# Patient Record
Sex: FEMALE | Race: WHITE | Employment: FULL TIME | URBAN - NONMETROPOLITAN AREA
[De-identification: names, ages, dates, MRNs, and addresses within clinical notes are randomized per-mention and may not be internally consistent; named-entity substitution may affect disease eponyms.]

---

## 2018-04-29 ENCOUNTER — APPOINTMENT (OUTPATIENT)
Dept: CT IMAGING | Age: 43
End: 2018-04-29
Payer: COMMERCIAL

## 2018-04-29 ENCOUNTER — HOSPITAL ENCOUNTER (EMERGENCY)
Age: 43
Discharge: HOME | End: 2018-04-29
Payer: COMMERCIAL

## 2018-04-29 VITALS
TEMPERATURE: 98.4 F | WEIGHT: 170 LBS | OXYGEN SATURATION: 94 % | DIASTOLIC BLOOD PRESSURE: 70 MMHG | SYSTOLIC BLOOD PRESSURE: 111 MMHG | RESPIRATION RATE: 18 BRPM | HEIGHT: 65 IN | BODY MASS INDEX: 28.32 KG/M2 | HEART RATE: 97 BPM

## 2018-04-29 DIAGNOSIS — G43.009 ATYPICAL MIGRAINE: Primary | ICD-10-CM

## 2018-04-29 LAB
ALBUMIN SERPL-MCNC: 4.5 G/DL (ref 3.5–5.2)
ALP BLD-CCNC: 30 U/L (ref 35–104)
ALT SERPL-CCNC: 12 U/L (ref 5–33)
ANION GAP SERPL CALCULATED.3IONS-SCNC: 14 MMOL/L (ref 7–19)
AST SERPL-CCNC: 14 U/L (ref 5–32)
BASOPHILS ABSOLUTE: 0 K/UL (ref 0–0.2)
BASOPHILS RELATIVE PERCENT: 0.3 % (ref 0–1)
BILIRUB SERPL-MCNC: 0.4 MG/DL (ref 0.2–1.2)
BUN BLDV-MCNC: 15 MG/DL (ref 6–20)
CALCIUM SERPL-MCNC: 9.3 MG/DL (ref 8.6–10)
CHLORIDE BLD-SCNC: 97 MMOL/L (ref 98–111)
CO2: 23 MMOL/L (ref 22–29)
CREAT SERPL-MCNC: 0.6 MG/DL (ref 0.5–0.9)
EOSINOPHILS ABSOLUTE: 0.1 K/UL (ref 0–0.6)
EOSINOPHILS RELATIVE PERCENT: 1.3 % (ref 0–5)
GFR NON-AFRICAN AMERICAN: >60
GLUCOSE BLD-MCNC: 113 MG/DL (ref 74–109)
GLUCOSE, CSF: 59 MG/DL (ref 40–70)
HCT VFR BLD CALC: 45.6 % (ref 37–47)
HEMOGLOBIN: 14.9 G/DL (ref 12–16)
LYMPHOCYTES ABSOLUTE: 1.8 K/UL (ref 1.1–4.5)
LYMPHOCYTES RELATIVE PERCENT: 19.9 % (ref 20–40)
MCH RBC QN AUTO: 30.3 PG (ref 27–31)
MCHC RBC AUTO-ENTMCNC: 32.7 G/DL (ref 33–37)
MCV RBC AUTO: 92.7 FL (ref 81–99)
MONOCYTES ABSOLUTE: 0.3 K/UL (ref 0–0.9)
MONOCYTES RELATIVE PERCENT: 3.7 % (ref 0–10)
NEUTROPHILS ABSOLUTE: 6.8 K/UL (ref 1.5–7.5)
NEUTROPHILS RELATIVE PERCENT: 74.6 % (ref 50–65)
PDW BLD-RTO: 12.5 % (ref 11.5–14.5)
PLATELET # BLD: 348 K/UL (ref 130–400)
PMV BLD AUTO: 10.6 FL (ref 9.4–12.3)
POTASSIUM SERPL-SCNC: 4.3 MMOL/L (ref 3.5–5)
PROTEIN CSF: 31 MG/DL (ref 15–45)
RBC # BLD: 4.92 M/UL (ref 4.2–5.4)
SODIUM BLD-SCNC: 134 MMOL/L (ref 136–145)
TOTAL PROTEIN: 7 G/DL (ref 6.6–8.7)
WBC # BLD: 9.1 K/UL (ref 4.8–10.8)

## 2018-04-29 PROCEDURE — 87075 CULTR BACTERIA EXCEPT BLOOD: CPT

## 2018-04-29 PROCEDURE — 96372 THER/PROPH/DIAG INJ SC/IM: CPT

## 2018-04-29 PROCEDURE — 80053 COMPREHEN METABOLIC PANEL: CPT

## 2018-04-29 PROCEDURE — 85025 COMPLETE CBC W/AUTO DIFF WBC: CPT

## 2018-04-29 PROCEDURE — 70450 CT HEAD/BRAIN W/O DYE: CPT

## 2018-04-29 PROCEDURE — 99284 EMERGENCY DEPT VISIT MOD MDM: CPT

## 2018-04-29 PROCEDURE — 6360000002 HC RX W HCPCS: Performed by: NURSE PRACTITIONER

## 2018-04-29 PROCEDURE — 99284 EMERGENCY DEPT VISIT MOD MDM: CPT | Performed by: NURSE PRACTITIONER

## 2018-04-29 PROCEDURE — 2580000003 HC RX 258: Performed by: NURSE PRACTITIONER

## 2018-04-29 PROCEDURE — 62270 DX LMBR SPI PNXR: CPT | Performed by: EMERGENCY MEDICINE

## 2018-04-29 PROCEDURE — 82945 GLUCOSE OTHER FLUID: CPT

## 2018-04-29 PROCEDURE — 96374 THER/PROPH/DIAG INJ IV PUSH: CPT

## 2018-04-29 PROCEDURE — 89050 BODY FLUID CELL COUNT: CPT

## 2018-04-29 PROCEDURE — 62270 DX LMBR SPI PNXR: CPT

## 2018-04-29 PROCEDURE — 96375 TX/PRO/DX INJ NEW DRUG ADDON: CPT

## 2018-04-29 PROCEDURE — 84157 ASSAY OF PROTEIN OTHER: CPT

## 2018-04-29 PROCEDURE — 36415 COLL VENOUS BLD VENIPUNCTURE: CPT

## 2018-04-29 PROCEDURE — 87070 CULTURE OTHR SPECIMN AEROBIC: CPT

## 2018-04-29 PROCEDURE — 87205 SMEAR GRAM STAIN: CPT

## 2018-04-29 RX ORDER — DIPHENHYDRAMINE HYDROCHLORIDE 50 MG/ML
25 INJECTION INTRAMUSCULAR; INTRAVENOUS ONCE
Status: COMPLETED | OUTPATIENT
Start: 2018-04-29 | End: 2018-04-29

## 2018-04-29 RX ORDER — METOCLOPRAMIDE HYDROCHLORIDE 5 MG/ML
10 INJECTION INTRAMUSCULAR; INTRAVENOUS ONCE
Status: COMPLETED | OUTPATIENT
Start: 2018-04-29 | End: 2018-04-29

## 2018-04-29 RX ORDER — CITALOPRAM 20 MG/1
20 TABLET ORAL DAILY
COMMUNITY

## 2018-04-29 RX ORDER — KETOROLAC TROMETHAMINE 30 MG/ML
30 INJECTION, SOLUTION INTRAMUSCULAR; INTRAVENOUS ONCE
Status: COMPLETED | OUTPATIENT
Start: 2018-04-29 | End: 2018-04-29

## 2018-04-29 RX ORDER — 0.9 % SODIUM CHLORIDE 0.9 %
1000 INTRAVENOUS SOLUTION INTRAVENOUS ONCE
Status: COMPLETED | OUTPATIENT
Start: 2018-04-29 | End: 2018-04-29

## 2018-04-29 RX ORDER — ORPHENADRINE CITRATE 30 MG/ML
60 INJECTION INTRAMUSCULAR; INTRAVENOUS ONCE
Status: COMPLETED | OUTPATIENT
Start: 2018-04-29 | End: 2018-04-29

## 2018-04-29 RX ORDER — TOPIRAMATE 25 MG/1
25 TABLET ORAL DAILY
Qty: 30 TABLET | Refills: 0 | Status: SHIPPED | OUTPATIENT
Start: 2018-04-29 | End: 2018-05-18 | Stop reason: ALTCHOICE

## 2018-04-29 RX ORDER — METOCLOPRAMIDE 10 MG/1
10 TABLET ORAL 4 TIMES DAILY
Qty: 120 TABLET | Refills: 0 | Status: SHIPPED | OUTPATIENT
Start: 2018-04-29 | End: 2018-04-29

## 2018-04-29 RX ORDER — METOCLOPRAMIDE 10 MG/1
10 TABLET ORAL DAILY
Qty: 30 TABLET | Refills: 0 | Status: SHIPPED | OUTPATIENT
Start: 2018-04-29 | End: 2018-05-18 | Stop reason: ALTCHOICE

## 2018-04-29 RX ADMIN — SODIUM CHLORIDE 1000 ML: 9 INJECTION, SOLUTION INTRAVENOUS at 20:15

## 2018-04-29 RX ADMIN — ORPHENADRINE CITRATE 60 MG: 30 INJECTION INTRAMUSCULAR; INTRAVENOUS at 17:51

## 2018-04-29 RX ADMIN — KETOROLAC TROMETHAMINE 30 MG: 30 INJECTION, SOLUTION INTRAMUSCULAR; INTRAVENOUS at 17:51

## 2018-04-29 RX ADMIN — METOCLOPRAMIDE 10 MG: 5 INJECTION, SOLUTION INTRAMUSCULAR; INTRAVENOUS at 17:52

## 2018-04-29 RX ADMIN — DIPHENHYDRAMINE HYDROCHLORIDE 25 MG: 50 INJECTION, SOLUTION INTRAMUSCULAR; INTRAVENOUS at 17:52

## 2018-04-29 RX ADMIN — SODIUM CHLORIDE 1000 ML: 9 INJECTION, SOLUTION INTRAVENOUS at 17:57

## 2018-04-29 ASSESSMENT — ENCOUNTER SYMPTOMS
SORE THROAT: 0
COUGH: 0
CONSTIPATION: 0
TROUBLE SWALLOWING: 0
DIARRHEA: 0
SINUS PRESSURE: 0
RHINORRHEA: 0
ABDOMINAL PAIN: 0
SINUS PAIN: 0
VOMITING: 1
NAUSEA: 1
SHORTNESS OF BREATH: 0
PHOTOPHOBIA: 1

## 2018-04-29 ASSESSMENT — PAIN DESCRIPTION - PAIN TYPE: TYPE: ACUTE PAIN

## 2018-04-29 ASSESSMENT — PAIN SCALES - GENERAL
PAINLEVEL_OUTOF10: 5
PAINLEVEL_OUTOF10: 8
PAINLEVEL_OUTOF10: 8
PAINLEVEL_OUTOF10: 5

## 2018-04-29 ASSESSMENT — PAIN DESCRIPTION - LOCATION: LOCATION: HEAD

## 2018-04-30 LAB
APPEARANCE CSF: CLEAR
CLOT EVALUATION CSF: NORMAL
COLOR CSF: COLORLESS
NO DIFFERENTIAL CSF: NORMAL
RBC CSF: 0 /CUMM (ref 0–5)
TUBE NUMBER CSF: NORMAL
WBC CSF: 7 /CUMM (ref 0–8)

## 2018-05-01 ENCOUNTER — TELEPHONE (OUTPATIENT)
Dept: NEUROLOGY | Age: 43
End: 2018-05-01

## 2018-05-04 ENCOUNTER — HOSPITAL ENCOUNTER (OUTPATIENT)
Dept: MRI IMAGING | Facility: HOSPITAL | Age: 43
Discharge: HOME | End: 2018-05-04
Attending: PEDIATRICS | Admitting: PEDIATRICS

## 2018-05-04 ENCOUNTER — TRANSCRIBE ORDERS (OUTPATIENT)
Dept: ADMINISTRATIVE | Facility: HOSPITAL | Age: 43
End: 2018-05-04

## 2018-05-04 DIAGNOSIS — R51.9 NONINTRACTABLE HEADACHE, UNSPECIFIED CHRONICITY PATTERN, UNSPECIFIED HEADACHE TYPE: ICD-10-CM

## 2018-05-04 DIAGNOSIS — R51.9 NONINTRACTABLE HEADACHE, UNSPECIFIED CHRONICITY PATTERN, UNSPECIFIED HEADACHE TYPE: Primary | ICD-10-CM

## 2018-05-04 PROCEDURE — 70551 MRI BRAIN STEM W/O DYE: CPT

## 2018-05-06 LAB
ANAEROBIC CULTURE: NORMAL
CSF CULTURE: NORMAL
GRAM STAIN RESULT: NORMAL

## 2018-05-18 ENCOUNTER — OFFICE VISIT (OUTPATIENT)
Dept: NEUROLOGY | Age: 43
End: 2018-05-18
Payer: COMMERCIAL

## 2018-05-18 VITALS
WEIGHT: 177 LBS | SYSTOLIC BLOOD PRESSURE: 132 MMHG | BODY MASS INDEX: 29.49 KG/M2 | OXYGEN SATURATION: 96 % | HEART RATE: 93 BPM | HEIGHT: 65 IN | DIASTOLIC BLOOD PRESSURE: 83 MMHG

## 2018-05-18 DIAGNOSIS — G43.011 INTRACTABLE MIGRAINE WITHOUT AURA AND WITH STATUS MIGRAINOSUS: Primary | ICD-10-CM

## 2018-05-18 DIAGNOSIS — R09.89 BILATERAL CAROTID BRUITS: ICD-10-CM

## 2018-05-18 DIAGNOSIS — J32.3 CHRONIC SPHENOIDAL SINUSITIS: ICD-10-CM

## 2018-05-18 DIAGNOSIS — R51.9 CHRONIC DAILY HEADACHE: ICD-10-CM

## 2018-05-18 PROCEDURE — 99204 OFFICE O/P NEW MOD 45 MIN: CPT | Performed by: PHYSICIAN ASSISTANT

## 2018-05-18 RX ORDER — TIZANIDINE 4 MG/1
4 TABLET ORAL 3 TIMES DAILY
Qty: 90 TABLET | Refills: 1 | Status: SHIPPED | OUTPATIENT
Start: 2018-05-18

## 2018-05-18 RX ORDER — ONDANSETRON 8 MG/1
TABLET, ORALLY DISINTEGRATING ORAL
Refills: 0 | COMMUNITY
Start: 2018-05-01

## 2018-05-18 RX ORDER — METHYLPREDNISOLONE 4 MG/1
TABLET ORAL
Qty: 1 KIT | Refills: 0 | Status: SHIPPED | OUTPATIENT
Start: 2018-05-18 | End: 2018-05-24

## 2018-05-18 RX ORDER — RIZATRIPTAN BENZOATE 10 MG/1
TABLET, ORALLY DISINTEGRATING ORAL
Refills: 0 | COMMUNITY
Start: 2018-05-01 | End: 2018-05-18 | Stop reason: CLARIF

## 2018-05-18 RX ORDER — BUTALBITAL, ACETAMINOPHEN AND CAFFEINE 300; 40; 50 MG/1; MG/1; MG/1
CAPSULE ORAL
Refills: 0 | COMMUNITY
Start: 2018-05-01

## 2018-05-18 RX ORDER — ONDANSETRON 4 MG/1
4 TABLET, FILM COATED ORAL EVERY 8 HOURS PRN
Qty: 30 TABLET | Refills: 1 | Status: SHIPPED | OUTPATIENT
Start: 2018-05-18

## 2018-05-21 DIAGNOSIS — R51.9 CHRONIC DAILY HEADACHE: ICD-10-CM

## 2018-05-21 LAB — SEDIMENTATION RATE, ERYTHROCYTE: 3 MM/HR (ref 0–20)

## 2018-05-23 ENCOUNTER — TELEPHONE (OUTPATIENT)
Dept: NEUROLOGY | Age: 43
End: 2018-05-23

## 2018-05-24 ENCOUNTER — TELEPHONE (OUTPATIENT)
Dept: NEUROLOGY | Age: 43
End: 2018-05-24

## 2018-06-29 ENCOUNTER — HOSPITAL ENCOUNTER (OUTPATIENT)
Dept: MRI IMAGING | Age: 43
Discharge: HOME | End: 2018-06-29
Payer: COMMERCIAL

## 2018-06-29 DIAGNOSIS — R09.89 BILATERAL CAROTID BRUITS: ICD-10-CM

## 2018-06-29 DIAGNOSIS — G43.011 INTRACTABLE MIGRAINE WITHOUT AURA AND WITH STATUS MIGRAINOSUS: ICD-10-CM

## 2018-06-29 DIAGNOSIS — R51.9 CHRONIC DAILY HEADACHE: ICD-10-CM

## 2018-06-29 PROCEDURE — 70547 MR ANGIOGRAPHY NECK W/O DYE: CPT

## 2018-06-29 PROCEDURE — 70544 MR ANGIOGRAPHY HEAD W/O DYE: CPT

## 2018-07-02 ENCOUNTER — TELEPHONE (OUTPATIENT)
Dept: NEUROLOGY | Age: 43
End: 2018-07-02

## 2018-07-02 DIAGNOSIS — R93.0 ABNORMAL MRA, HEAD: Primary | ICD-10-CM

## 2018-07-02 NOTE — TELEPHONE ENCOUNTER
Dr. Alyssa Piña contacted me Friday to discuss the results of the MRA. He compared the MRA to a previous \"normal\" brain MRI from [facility] and felt like there may be a tumor/glioma but he recommended a repeat brain MRI with contrast. I have left a VM for Miguel Leo to call me back at the office to discuss the results further. I did not give the actual results, just that I would like to talk to her about them.

## 2018-07-12 ENCOUNTER — HOSPITAL ENCOUNTER (OUTPATIENT)
Dept: MRI IMAGING | Age: 43
Discharge: HOME | End: 2018-07-12
Payer: COMMERCIAL

## 2018-07-12 DIAGNOSIS — R93.0 ABNORMAL MRA, HEAD: ICD-10-CM

## 2018-07-12 PROCEDURE — 70553 MRI BRAIN STEM W/O & W/DYE: CPT

## 2018-07-12 PROCEDURE — 6360000004 HC RX CONTRAST MEDICATION: Performed by: PHYSICIAN ASSISTANT

## 2018-07-12 PROCEDURE — A9577 INJ MULTIHANCE: HCPCS | Performed by: PHYSICIAN ASSISTANT

## 2018-07-12 RX ADMIN — GADOBENATE DIMEGLUMINE 15 ML: 529 INJECTION, SOLUTION INTRAVENOUS at 13:12

## 2018-07-12 NOTE — PROGRESS NOTES
[facility] Neurology Follow Up Encounter      Information:   Patient Name: Geraldo Roberts  :   1975  Age:   38 y.o. MRN:   [id number]  Account #:  [de-identified]  Date:              18    Provider:  Lisa Fish PA-C    Chief Complaint   Patient presents with    Follow-up     migraines, abnormal MRI, pt states she is still having headaches       Subjective:   Geraldo Roberts is a 38 y.o. female  with a history of migraines and abnormal brain MRI who comes in for a follow up. At her last office visit, 2018 she was referred for diagnostic studies to evaluate the migraines, chronic daily headaches, and bilateral carotid bruits. I was contacted by Dr. Mel Chang after the MRA, was completed, 2018 and he reported that he compared the MRA to a previous \"normal\" brain MRI from [facility] and felt like there may be a tumor/glioma and recommended a repeat brain MRI with contrast. I did follow up with Ms. Danial Bence and discussed this and she agreed to the MRI brain with contrast. It was completed, 18 and it did reveal an area in the left frontal cortex suspicious for low-grade glioma. She is here today to discuss results of the brain MRI. Today she reports that there has been a change in character of the headaches since the last office visit. The daily headache is  described as a paroxysmal sharp pain that last a few seconds. The location is behind her left eye. She denies any change in vision. She denies weakness, numbness, and tingling. There is photophobia but no phonophobia or nausea. Objective:     Past Medical History:   Diagnosis Date    Migraines        History reviewed. No pertinent surgical history.     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 In the posterior fossa the cerebellum and brainstem are normal. Fourth   ventricle is midline. In the supratentorial compartment third ventricle is midline. Lateral   ventricles are symmetric. On the FLAIR sequence again on image 22 and 23 there is abnormal   signal in the left frontal cortex. This does not enhance. This is   suspicious for a low-grade glioma. Continued follow-up is suggested.       Impression   Abnormal FLAIR signal in the left frontal cortex. This is   similar to prior exam of May 4, 2018. A low grade glioma cannot be   excluded and follow-up is suggested   Signed by Dr Jocelyn Mcarthur on 7/12/2018 1:24 PM     MRA HEAD WO CONTRAST, MRA NECK WO CONTRAST 6/29/2018 8:14 AM   HISTORY: G43.011   TECHNIQUE:    1. 3-D time-of-flight imaging was performed through the [place] [facility]   [place]. 3-D and maximum intensity projection (MIP) images were created   from the axial source data. 2. 3-D time-of-flight imaging of the neck. 3-D and maximum intensity   projection (MIP) images were created from the axial source data. COMPARISON: Brain MRI dated 5/4/2018    FINDINGS:    There appears to be normal branching pattern off the aortic arch. Bilateral common carotid arteries are normal in caliber. No   significant atheromatous narrowing identified at the carotid bulbs. The proximal internal cerebral arteries are normal in caliber and   smooth. There is a short segment of the distal left cervical ICA   showing a beaded appearance. No apparent dissection or pseudoaneurysm. Posteriorly, the vertebral arteries appear normal in caliber. Vertebral arteries are codominant. No obvious dissection,   pseudoaneurysm or flow-limiting stenosis. Normal flow-related signal is seen in the larger/central arteries of   the anterior and posterior cerebral circulation. Specifically, no   evidence of significant stenosis, occlusion, or aneurysm is   identified. During comparison with the prior noncontrast MRI, an area of masslike   FLAIR hyperintensity was identified in the left frontal lobe measuring   approximately 3.7 x 2.4 cm (series 501-image 19 from the comparison   MRI).     Impression   1. Beaded appearance of the distal left cervical ICA suggesting   fibromuscular dysplasia. No visualized dissection or pseudoaneurysm. 2. No flow-limiting vascular stenosis identified in the neck. 3. Normal brain MRA. 4.  Abnormal masslike FLAIR hyperintensity in the left frontal lobe on   prior (outside) comparison MRI. Primary consideration is low-grade   glioma. Recommend contrast-enhanced brain MRI to ensure no enhancing   lesion/enhancing nodule. The results of this exam were discussed with Scotty Scanlon on 6/29/2018   at 1553 hours   Signed by Dr Jim Angelo on 6/29/2018 3:54 PM     MRA HEAD WO CONTRAST, MRA NECK WO CONTRAST 6/29/2018 8:14 AM   HISTORY: G43.011   TECHNIQUE:    1. 3-D time-of-flight imaging was performed through the [place]   [place]. 3-D and maximum intensity projection (MIP) images were created   from the axial source data. 2. 3-D time-of-flight imaging of the neck. 3-D and maximum intensity   projection (MIP) images were created from the axial source data. COMPARISON: Brain MRI dated 5/4/2018    FINDINGS:    There appears to be normal branching pattern off the aortic arch. Bilateral common carotid arteries are normal in caliber. No   significant atheromatous narrowing identified at the carotid bulbs. The proximal internal cerebral arteries are normal in caliber and   smooth.  There is a short segment of the distal left

## 2018-07-13 ENCOUNTER — OFFICE VISIT (OUTPATIENT)
Dept: NEUROLOGY | Age: 43
End: 2018-07-13
Payer: COMMERCIAL

## 2018-07-13 VITALS
SYSTOLIC BLOOD PRESSURE: 134 MMHG | BODY MASS INDEX: 29.66 KG/M2 | WEIGHT: 178 LBS | OXYGEN SATURATION: 99 % | HEART RATE: 99 BPM | HEIGHT: 65 IN | DIASTOLIC BLOOD PRESSURE: 73 MMHG

## 2018-07-13 DIAGNOSIS — I77.3 FIBROMUSCULAR DYSPLASIA (HCC): ICD-10-CM

## 2018-07-13 DIAGNOSIS — G43.011 INTRACTABLE MIGRAINE WITHOUT AURA AND WITH STATUS MIGRAINOSUS: ICD-10-CM

## 2018-07-13 DIAGNOSIS — R09.89 BILATERAL CAROTID BRUITS: ICD-10-CM

## 2018-07-13 DIAGNOSIS — R51.9 CHRONIC DAILY HEADACHE: ICD-10-CM

## 2018-07-13 DIAGNOSIS — C71.9 LOW GRADE GLIOMA OF BRAIN (HCC): Primary | ICD-10-CM

## 2018-07-13 PROCEDURE — 99214 OFFICE O/P EST MOD 30 MIN: CPT | Performed by: PHYSICIAN ASSISTANT

## 2018-07-13 RX ORDER — TOPIRAMATE 25 MG/1
TABLET ORAL
Qty: 42 TABLET | Refills: 0 | Status: SHIPPED | OUTPATIENT
Start: 2018-07-13 | End: 2018-11-06 | Stop reason: CLARIF

## 2018-07-13 RX ORDER — TOPIRAMATE 100 MG/1
TABLET, FILM COATED ORAL
Qty: 30 TABLET | Refills: 2 | Status: SHIPPED | OUTPATIENT
Start: 2018-07-13 | End: 2018-11-06 | Stop reason: SDUPTHER

## 2018-07-13 NOTE — PROGRESS NOTES

## 2018-07-16 ENCOUNTER — TELEPHONE (OUTPATIENT)
Dept: NEUROSURGERY | Age: 43
End: 2018-07-16

## 2018-07-16 ENCOUNTER — TELEPHONE (OUTPATIENT)
Dept: NEUROLOGY | Age: 43
End: 2018-07-16

## 2018-07-17 NOTE — TELEPHONE ENCOUNTER
Do you want the patient to have lidocaine patches? Can have Eduin Burciaga work on [place] if needed.

## 2018-07-19 ENCOUNTER — OFFICE VISIT (OUTPATIENT)
Dept: NEUROSURGERY | Age: 43
End: 2018-07-19
Payer: COMMERCIAL

## 2018-07-19 VITALS
WEIGHT: 178 LBS | BODY MASS INDEX: 29.66 KG/M2 | OXYGEN SATURATION: 98 % | SYSTOLIC BLOOD PRESSURE: 138 MMHG | DIASTOLIC BLOOD PRESSURE: 87 MMHG | HEIGHT: 65 IN | HEART RATE: 85 BPM

## 2018-07-19 DIAGNOSIS — R51.9 FREQUENT HEADACHES: Primary | ICD-10-CM

## 2018-07-19 DIAGNOSIS — R90.89 ABNORMAL BRAIN MRI: ICD-10-CM

## 2018-07-19 DIAGNOSIS — R11.2 NON-INTRACTABLE VOMITING WITH NAUSEA, UNSPECIFIED VOMITING TYPE: ICD-10-CM

## 2018-07-19 PROCEDURE — 99204 OFFICE O/P NEW MOD 45 MIN: CPT | Performed by: NURSE PRACTITIONER

## 2018-07-19 ASSESSMENT — ENCOUNTER SYMPTOMS
RESPIRATORY NEGATIVE: 1
EYES NEGATIVE: 1
GASTROINTESTINAL NEGATIVE: 1

## 2018-07-19 NOTE — PROGRESS NOTES
Brain    Review of Systems   Constitutional: Negative. HENT: Negative. Eyes: Negative. Respiratory: Negative. Cardiovascular: Negative. Gastrointestinal: Negative. Genitourinary: Negative. Musculoskeletal: Negative. Skin: Negative. Neurological: Positive for headaches. Negative for dizziness, tingling, tremors, sensory change, speech change, focal weakness, seizures and loss of consciousness. Endo/Heme/Allergies: Negative. Psychiatric/Behavioral: Negative.

## 2018-07-19 NOTE — PROGRESS NOTES
Results   Component Value Date    WBC 9.1 04/29/2018    HGB 14.9 04/29/2018    HCT 45.6 04/29/2018    MCV 92.7 04/29/2018     04/29/2018     Lab Results   Component Value Date     (L) 04/29/2018    K 4.3 04/29/2018    CL 97 (L) 04/29/2018    CO2 23 04/29/2018    BUN 15 04/29/2018    CREATININE 0.6 04/29/2018    GLUCOSE 113 (H) 04/29/2018    CALCIUM 9.3 04/29/2018    PROT 7.0 04/29/2018    LABALBU 4.5 04/29/2018    BILITOT 0.4 04/29/2018    ALKPHOS 30 (L) 04/29/2018    AST 14 04/29/2018    ALT 12 04/29/2018    LABGLOM >60 04/29/2018   No results found for: INR, PROTIME      Narrative   EXAMINATION: MRI BRAIN W WO CONTRAST 7/12/2018 1:22 PM   HISTORY: Abnormal FLAIR on prior MRs   Multiplanar images are obtained without and with gadolinium. In the posterior fossa the cerebellum and brainstem are normal. Fourth   ventricle is midline. In the supratentorial compartment third ventricle is midline. Lateral   ventricles are symmetric. On the FLAIR sequence again on image 22 and 23 there is abnormal   signal in the left frontal cortex. This does not enhance. This is   suspicious for a low-grade glioma. Continued follow-up is suggested.       Impression   Abnormal FLAIR signal in the left frontal cortex. This is   similar to prior exam of May 4, 2018. A low grade glioma cannot be   excluded and follow-up is suggested   Signed by Dr Hung Stoner on 7/12/2018 1:24 PM   I have personally reviewed these images and my interpretation is:  Left frontal lobe, the superior frontal gyrus has possibly slightly increased T2 signal on FLAIR that could represent some underlying a low grade glioma, however, it is less likely. Results for Ruthell Gaucher (MRN [id number]) as of 7/19/2018 14:46   Ref.  Range 4/29/2018 20:30   Appearance, CSF Latest Ref Range: Clear  Clear   Clot Evaluation CSF Unknown see below   CSF Culture Unknown No growth 7 days   Glucose, CSF Latest Ref Range: 40 - 70 mg/dL 59   No differential CSF Unknown see below   Protein, CSF Latest Ref Range: 15 - 45 mg/dL 31   RBC, CSF Latest Ref Range: 0 - 5 /cumm 0   WBC, CSF Latest Ref Range: 0 - 8 /cumm 7   Color, CSF Latest Ref Range: Colorless  Colorless   Tube Number + CELL CT + DIFF-CSF Unknown Tube 2           ASSESSMENT:    This is a 38 y.o. female who presents with complaints of frequent headaches with one severe episode of head pain and nausea and vomiting. ICD-10-CM ICD-9-CM    1. Frequent headaches R51 784.0 MRI BRAIN W WO CONTRAST   2. Abnormal brain MRI R90.89 793.0 MRI BRAIN W WO CONTRAST   3. Non-intractable vomiting with nausea, unspecified vomiting type R11.2 787.01        PLAN:  -We discussed and reviewed the results/findings of the MRI brain at length with Mrs. Wilmer Vogel and her spouse at length. We explained that we feel that the abnormal signal on the imaging is likely a normal variant, however, we would like to obtain repeat imaging in a few months to ensure no change. We also do not feel that the area on the imaging is causing her headaches.     -Repeat MRI brain in 3 months    PATRICK Vazquez

## 2018-11-06 RX ORDER — TOPIRAMATE 100 MG/1
TABLET, FILM COATED ORAL
Qty: 30 TABLET | Refills: 2 | Status: SHIPPED | OUTPATIENT
Start: 2018-11-06

## 2018-11-09 RX ORDER — TOPIRAMATE 100 MG/1
TABLET, FILM COATED ORAL
Qty: 30 TABLET | Refills: 2 | OUTPATIENT
Start: 2018-11-09

## 2019-03-08 ENCOUNTER — OFFICE VISIT (OUTPATIENT)
Dept: RETAIL CLINIC | Facility: CLINIC | Age: 44
End: 2019-03-08

## 2019-03-08 DIAGNOSIS — H61.21 IMPACTED CERUMEN OF RIGHT EAR: Primary | ICD-10-CM

## 2019-03-08 PROCEDURE — 69209 REMOVE IMPACTED EAR WAX UNI: CPT | Performed by: NURSE PRACTITIONER

## 2019-03-08 NOTE — PROGRESS NOTES
She is here with son for sick visit and said she has ear wax buildup in right ear.  I looked in both ears, and left is clear, right had large amount of cerumen blocking canal.  She wanted me to do ear lavage, so this procedure was done, and we easily removed a large amount of cerumen from right ear canal.  TM intact and without erythema.  I've asked her to let me know if any problems.

## 2019-03-08 NOTE — PATIENT INSTRUCTIONS
Earwax Buildup, Adult  The ears produce a substance called earwax that helps keep bacteria out of the ear and protects the skin in the ear canal. Occasionally, earwax can build up in the ear and cause discomfort or hearing loss.  What increases the risk?  This condition is more likely to develop in people who:  · Are male.  · Are elderly.  · Naturally produce more earwax.  · Clean their ears often with cotton swabs.  · Use earplugs often.  · Use in-ear headphones often.  · Wear hearing aids.  · Have narrow ear canals.  · Have earwax that is overly thick or sticky.  · Have eczema.  · Are dehydrated.  · Have excess hair in the ear canal.    What are the signs or symptoms?  Symptoms of this condition include:  · Reduced or muffled hearing.  · A feeling of fullness in the ear or feeling that the ear is plugged.  · Fluid coming from the ear.  · Ear pain.  · Ear itch.  · Ringing in the ear.  · Coughing.  · An obvious piece of earwax that can be seen inside the ear canal.    How is this diagnosed?  This condition may be diagnosed based on:  · Your symptoms.  · Your medical history.  · An ear exam. During the exam, your health care provider will look into your ear with an instrument called an otoscope.    You may have tests, including a hearing test.  How is this treated?  This condition may be treated by:  · Using ear drops to soften the earwax.  · Having the earwax removed by a health care provider. The health care provider may:  ? Flush the ear with water.  ? Use an instrument that has a loop on the end (curette).  ? Use a suction device.  · Surgery to remove the wax buildup. This may be done in severe cases.    Follow these instructions at home:  · Take over-the-counter and prescription medicines only as told by your health care provider.  · Do not put any objects, including cotton swabs, into your ear. You can clean the opening of your ear canal with a washcloth or facial tissue.  · Follow instructions from your health  care provider about cleaning your ears. Do not over-clean your ears.  · Drink enough fluid to keep your urine clear or pale yellow. This will help to thin the earwax.  · Keep all follow-up visits as told by your health care provider. If earwax builds up in your ears often or if you use hearing aids, consider seeing your health care provider for routine, preventive ear cleanings. Ask your health care provider how often you should schedule your cleanings.  · If you have hearing aids, clean them according to instructions from the  and your health care provider.  Contact a health care provider if:  · You have ear pain.  · You develop a fever.  · You have blood, pus, or other fluid coming from your ear.  · You have hearing loss.  · You have ringing in your ears that does not go away.  · Your symptoms do not improve with treatment.  · You feel like the room is spinning (vertigo).  Summary  · Earwax can build up in the ear and cause discomfort or hearing loss.  · The most common symptoms of this condition include reduced or muffled hearing and a feeling of fullness in the ear or feeling that the ear is plugged.  · This condition may be diagnosed based on your symptoms, your medical history, and an ear exam.  · This condition may be treated by using ear drops to soften the earwax or by having the earwax removed by a health care provider.  · Do not put any objects, including cotton swabs, into your ear. You can clean the opening of your ear canal with a washcloth or facial tissue.  This information is not intended to replace advice given to you by your health care provider. Make sure you discuss any questions you have with your health care provider.  Document Released: 01/25/2006 Document Revised: 02/28/2018 Document Reviewed: 02/28/2018  [employer] Patient Education © 2018 [employer]

## 2019-03-11 RX ORDER — METHYLPREDNISOLONE 4 MG/1
TABLET ORAL
Qty: 1 EACH | Refills: 0 | OUTPATIENT
Start: 2019-03-11 | End: 2020-11-28

## 2019-03-11 NOTE — PROGRESS NOTES
Patient says her ear still feels stopped up, and she is having some vertigo.  She has had inner ear trouble in the past and has been given MDP; feels this is inner ear, nothing else.  Requests MDP today.  Says she is using Flonase, Sudafed, and meclizine when she's not at work.  Rx sent. I asked patient to RTC 2-3 day if sx not improved.

## 2020-01-21 ENCOUNTER — TRANSCRIBE ORDERS (OUTPATIENT)
Dept: ADMINISTRATIVE | Facility: HOSPITAL | Age: 45
End: 2020-01-21

## 2020-01-21 ENCOUNTER — APPOINTMENT (OUTPATIENT)
Dept: LAB | Facility: HOSPITAL | Age: 45
End: 2020-01-21

## 2020-01-21 DIAGNOSIS — Z12.31 ENCOUNTER FOR SCREENING MAMMOGRAM FOR MALIGNANT NEOPLASM OF BREAST: Primary | ICD-10-CM
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
## 2020-01-21 LAB
25(OH)D3 SERPL-MCNC: 30.1 NG/ML (ref 30–100)
ALBUMIN SERPL-MCNC: 4.4 G/DL (ref 3.5–5)
ALBUMIN/GLOB SERPL: 1.3 G/DL (ref 1.1–2.5)
ALP SERPL-CCNC: 33 U/L (ref 24–120)
ALT SERPL W P-5'-P-CCNC: 20 U/L (ref 0–54)
ANION GAP SERPL CALCULATED.3IONS-SCNC: 6 MMOL/L (ref 4–13)
AST SERPL-CCNC: 20 U/L (ref 7–45)
AUTO MIXED CELLS #: 0.5 10*3/MM3 (ref 0.1–2.6)
AUTO MIXED CELLS %: 5.5 % (ref 0.1–24)
BACTERIA UR QL AUTO: ABNORMAL /HPF
BILIRUB SERPL-MCNC: 0.7 MG/DL (ref 0.1–1)
BILIRUB UR QL STRIP: NEGATIVE
BUN BLD-MCNC: 14 MG/DL (ref 5–21)
BUN/CREAT SERPL: 18.9
CALCIUM SPEC-SCNC: 9.4 MG/DL (ref 8.4–10.4)
CHLORIDE SERPL-SCNC: 102 MMOL/L (ref 98–110)
CHOLEST SERPL-MCNC: 178 MG/DL (ref 130–200)
CLARITY UR: CLEAR
CO2 SERPL-SCNC: 29 MMOL/L (ref 24–31)
COLOR UR: YELLOW
CREAT BLD-MCNC: 0.74 MG/DL (ref 0.5–1.4)
ERYTHROCYTE [DISTWIDTH] IN BLOOD BY AUTOMATED COUNT: 13.1 % (ref 12.3–15.4)
GFR SERPL CREATININE-BSD FRML MDRD: 86 ML/MIN/1.73
GLOBULIN UR ELPH-MCNC: 3.4 GM/DL
GLUCOSE BLD-MCNC: 98 MG/DL (ref 70–100)
GLUCOSE UR STRIP-MCNC: NEGATIVE MG/DL
HCT VFR BLD AUTO: 42.9 % (ref 34–46.6)
HDLC SERPL-MCNC: 49 MG/DL
HGB BLD-MCNC: 14.4 G/DL (ref 12–15.9)
HGB UR QL STRIP.AUTO: NEGATIVE
HYALINE CASTS UR QL AUTO: ABNORMAL /LPF
KETONES UR QL STRIP: NEGATIVE
LDLC SERPL CALC-MCNC: 107 MG/DL (ref 0–99)
LDLC/HDLC SERPL: 2.18 {RATIO}
LEUKOCYTE ESTERASE UR QL STRIP.AUTO: ABNORMAL
LYMPHOCYTES # BLD AUTO: 1.9 10*3/MM3 (ref 0.7–3.1)
LYMPHOCYTES NFR BLD AUTO: 23.1 % (ref 19.6–45.3)
MCH RBC QN AUTO: 30.5 PG (ref 26.6–33)
MCHC RBC AUTO-ENTMCNC: 33.6 G/DL (ref 31.5–35.7)
MCV RBC AUTO: 90.9 FL (ref 79–97)
MUCOUS THREADS URNS QL MICRO: ABNORMAL /HPF
NEUTROPHILS # BLD AUTO: 5.8 10*3/MM3 (ref 1.7–7)
NEUTROPHILS NFR BLD AUTO: 71.4 % (ref 42.7–76)
NITRITE UR QL STRIP: NEGATIVE
PH UR STRIP.AUTO: 6 [PH] (ref 5–8)
PLATELET # BLD AUTO: 307 10*3/MM3 (ref 140–450)
PMV BLD AUTO: 9.7 FL (ref 6–12)
POTASSIUM BLD-SCNC: 4.2 MMOL/L (ref 3.5–5.3)
PROT SERPL-MCNC: 7.8 G/DL (ref 6.3–8.7)
PROT UR QL STRIP: NEGATIVE
RBC # BLD AUTO: 4.72 10*6/MM3 (ref 3.77–5.28)
RBC # UR: ABNORMAL /HPF
REF LAB TEST METHOD: ABNORMAL
SODIUM BLD-SCNC: 137 MMOL/L (ref 135–145)
SP GR UR STRIP: 1.02 (ref 1–1.03)
SQUAMOUS #/AREA URNS HPF: ABNORMAL /HPF
TRIGL SERPL-MCNC: 112 MG/DL (ref 0–149)
TSH SERPL DL<=0.05 MIU/L-ACNC: 1.26 UIU/ML (ref 0.27–4.2)
UROBILINOGEN UR QL STRIP: ABNORMAL
VLDLC SERPL-MCNC: 22.4 MG/DL
WBC NRBC COR # BLD: 8.2 10*3/MM3 (ref 3.4–10.8)
WBC UR QL AUTO: ABNORMAL /HPF

## 2020-01-21 PROCEDURE — 87086 URINE CULTURE/COLONY COUNT: CPT | Performed by: NURSE PRACTITIONER

## 2020-01-21 PROCEDURE — 36415 COLL VENOUS BLD VENIPUNCTURE: CPT | Performed by: NURSE PRACTITIONER

## 2020-01-21 PROCEDURE — 80061 LIPID PANEL: CPT | Performed by: NURSE PRACTITIONER

## 2020-01-21 PROCEDURE — 84443 ASSAY THYROID STIM HORMONE: CPT | Performed by: NURSE PRACTITIONER

## 2020-01-21 PROCEDURE — 82306 VITAMIN D 25 HYDROXY: CPT | Performed by: NURSE PRACTITIONER

## 2020-01-21 PROCEDURE — 85025 COMPLETE CBC W/AUTO DIFF WBC: CPT | Performed by: NURSE PRACTITIONER

## 2020-01-21 PROCEDURE — 81001 URINALYSIS AUTO W/SCOPE: CPT | Performed by: NURSE PRACTITIONER

## 2020-01-21 PROCEDURE — 80053 COMPREHEN METABOLIC PANEL: CPT | Performed by: NURSE PRACTITIONER

## 2020-01-22 LAB — BACTERIA SPEC AEROBE CULT: NORMAL

## 2020-11-28 PROCEDURE — 87635 SARS-COV-2 COVID-19 AMP PRB: CPT | Performed by: NURSE PRACTITIONER

## 2021-03-01 ENCOUNTER — TELEPHONE (OUTPATIENT)
Dept: NEUROLOGY | Age: 46
End: 2021-03-01

## 2021-03-31 ENCOUNTER — TELEPHONE (OUTPATIENT)
Dept: NEUROLOGY | Age: 46
End: 2021-03-31

## 2021-03-31 NOTE — TELEPHONE ENCOUNTER
Called patient to reschedule no show appt with Pelon Timmons, left a voicemail for the patient to give us a call back if they would like to reschedule.

## 2021-12-21 ENCOUNTER — OFFICE VISIT (OUTPATIENT)
Dept: FAMILY MEDICINE CLINIC | Facility: CLINIC | Age: 46
End: 2021-12-21

## 2021-12-21 VITALS
TEMPERATURE: 98.1 F | HEIGHT: 65 IN | WEIGHT: 181 LBS | DIASTOLIC BLOOD PRESSURE: 86 MMHG | SYSTOLIC BLOOD PRESSURE: 153 MMHG | HEART RATE: 87 BPM | RESPIRATION RATE: 20 BRPM | BODY MASS INDEX: 30.16 KG/M2

## 2021-12-21 DIAGNOSIS — E66.9 CLASS 1 OBESITY WITH BODY MASS INDEX (BMI) OF 30.0 TO 30.9 IN ADULT, UNSPECIFIED OBESITY TYPE, UNSPECIFIED WHETHER SERIOUS COMORBIDITY PRESENT: ICD-10-CM

## 2021-12-21 DIAGNOSIS — F41.8 MIXED ANXIETY AND DEPRESSIVE DISORDER: Primary | ICD-10-CM

## 2021-12-21 PROBLEM — R09.89 BILATERAL CAROTID BRUITS: Status: ACTIVE | Noted: 2018-05-18

## 2021-12-21 PROBLEM — I77.3 FIBROMUSCULAR DYSPLASIA (HCC): Status: ACTIVE | Noted: 2018-07-13

## 2021-12-21 PROBLEM — G43.011 INTRACTABLE MIGRAINE WITHOUT AURA AND WITH STATUS MIGRAINOSUS: Status: ACTIVE | Noted: 2018-05-18

## 2021-12-21 PROBLEM — C71.9 LOW GRADE GLIOMA OF BRAIN (HCC): Status: ACTIVE | Noted: 2018-07-13

## 2021-12-21 PROBLEM — E66.811 CLASS 1 OBESITY WITH BODY MASS INDEX (BMI) OF 30.0 TO 30.9 IN ADULT: Status: ACTIVE | Noted: 2021-12-21

## 2021-12-21 PROBLEM — R51.9 CHRONIC DAILY HEADACHE: Status: ACTIVE | Noted: 2018-05-18

## 2021-12-21 PROBLEM — J32.3 CHRONIC SPHENOIDAL SINUSITIS: Status: ACTIVE | Noted: 2018-05-18

## 2021-12-21 PROCEDURE — 99214 OFFICE O/P EST MOD 30 MIN: CPT | Performed by: NURSE PRACTITIONER

## 2021-12-21 RX ORDER — CITALOPRAM 10 MG/1
10 TABLET ORAL DAILY
Qty: 30 TABLET | Refills: 2 | Status: SHIPPED | OUTPATIENT
Start: 2021-12-21 | End: 2022-01-12 | Stop reason: SDUPTHER

## 2021-12-21 RX ORDER — BUSPIRONE HYDROCHLORIDE 10 MG/1
10 TABLET ORAL 3 TIMES DAILY PRN
Qty: 90 TABLET | Refills: 2 | Status: SHIPPED | OUTPATIENT
Start: 2021-12-21

## 2021-12-21 RX ORDER — BUSPIRONE HYDROCHLORIDE 10 MG/1
10 TABLET ORAL 3 TIMES DAILY PRN
COMMUNITY
End: 2021-12-21 | Stop reason: SDUPTHER

## 2021-12-21 NOTE — PROGRESS NOTES
"Chief Complaint  f/u anxiety    Subjective    History of Present Illness      Patient presents to [facility] for   Pt states that she is here for a f/u with anxiety and says her mood is stable.     Anxiety  Presents for follow-up visit. The quality of sleep is good. Nighttime awakenings: none.            Review of Systems   Constitutional: Negative.    HENT: Negative.    Eyes: Negative.    Respiratory: Negative.    Cardiovascular: Negative.    Gastrointestinal: Negative.    Endocrine: Negative.    Genitourinary: Negative.    Musculoskeletal: Negative.    Skin: Negative.    Allergic/Immunologic: Negative.    Neurological: Negative.    Hematological: Negative.    Psychiatric/Behavioral: Negative.        I have reviewed and agree with the HPI and ROS information as above.  Yumiko Zazueta, APRN     Objective   Vital Signs:   /86   Pulse 87   Temp 98.1 °F (36.7 °C)   Resp 20   Ht 165.1 cm (65\")   Wt 82.1 kg (181 lb)   BMI 30.12 kg/m²       Physical Exam  Constitutional:       Appearance: Normal appearance. She is well-developed. She is obese.   HENT:      Head: Normocephalic and atraumatic.      Right Ear: External ear normal.      Left Ear: External ear normal.      Nose: Nose normal. No nasal tenderness or congestion.      Mouth/Throat:      Lips: Pink. No lesions.      Mouth: Mucous membranes are moist. No oral lesions.      Dentition: Normal dentition.      Pharynx: Oropharynx is clear. No pharyngeal swelling, oropharyngeal exudate or posterior oropharyngeal erythema.   Eyes:      General: Lids are normal. Vision grossly intact. No scleral icterus.        Right eye: No discharge.         Left eye: No discharge.      Extraocular Movements: Extraocular movements intact.      Conjunctiva/sclera: Conjunctivae normal.      Right eye: Right conjunctiva is not injected.      Left eye: Left conjunctiva is not injected.      Pupils: Pupils are equal, round, and reactive to light. "   Cardiovascular:      Rate and Rhythm: Normal rate and regular rhythm.      Heart sounds: Normal heart sounds. No murmur heard.  No gallop.    Pulmonary:      Effort: Pulmonary effort is normal.      Breath sounds: Normal breath sounds and air entry. No wheezing, rhonchi or rales.   Musculoskeletal:         General: No tenderness or deformity. Normal range of motion.      Cervical back: Full passive range of motion without pain, normal range of motion and neck supple.      Right lower leg: No edema.      Left lower leg: No edema.   Skin:     General: Skin is warm and dry.      Coloration: Skin is not jaundiced.      Findings: No rash.   Neurological:      Mental Status: She is alert and oriented to person, place, and time.      Cranial Nerves: Cranial nerves are intact.      Sensory: Sensation is intact.      Motor: Motor function is intact.      Coordination: Coordination is intact.      Gait: Gait is intact.   Psychiatric:         Attention and Perception: Attention normal.         Mood and Affect: Mood and affect normal.         Behavior: Behavior is not hyperactive. Behavior is cooperative.         Thought Content: Thought content normal.         Judgment: Judgment normal.          Result Review  Data Reviewed:              Assessment and Plan      Problem List Items Addressed This Visit        Endocrine and Metabolic    Class 1 obesity with body mass index (BMI) of 30.0 to 30.9 in adult       Mental Health    Mixed anxiety and depressive disorder - Primary    Relevant Medications    citalopram (CeleXA) 10 MG tablet    busPIRone (BUSPAR) 10 MG tablet        Pt here today for an anxiety and depression follow up. She has been off of her medications for several months. She c/o increased anxiety symptoms more so than depression at this time. She has done well in the past with celexa and buspar and would like to continue the same. Will restart at this time. F/u 1-3 months.       Follow Up   Return in about 3 months  (around 3/21/2022) for Recheck.  Patient was given instructions and counseling regarding her condition or for health maintenance advice. Please see specific information pulled into the AVS if appropriate.

## 2022-01-12 ENCOUNTER — TELEPHONE (OUTPATIENT)
Dept: PEDIATRICS | Facility: CLINIC | Age: 47
End: 2022-01-12

## 2022-01-12 DIAGNOSIS — F41.8 MIXED ANXIETY AND DEPRESSIVE DISORDER: ICD-10-CM

## 2022-01-12 RX ORDER — CITALOPRAM 10 MG/1
20 TABLET ORAL DAILY
Qty: 30 TABLET | Refills: 0 | Status: SHIPPED | OUTPATIENT
Start: 2022-01-12 | End: 2022-02-22

## 2022-01-12 NOTE — TELEPHONE ENCOUNTER
Caller: Aysha Barros    Relationship: Self    What medication are you requesting: Celexa 20mg    If a prescription is needed, what is your preferred pharmacy and phone number: [facility]/PHARMACY #[id number] - [place] - [address] [phone] [facility] [phone]      Callback @ [phone]

## 2022-01-12 NOTE — TELEPHONE ENCOUNTER
Monalisa OWUSU said ok to increase celexa to 20 mg daily. Pt aware this increase has been sent to pharmacy.

## 2022-01-12 NOTE — TELEPHONE ENCOUNTER
Pt would like to get Celexa increased to 20 mg daily, states she is struggling at work. Sent message to provider to address.

## 2022-01-17 ENCOUNTER — TELEPHONE (OUTPATIENT)
Dept: PEDIATRICS | Facility: CLINIC | Age: 47
End: 2022-01-17

## 2022-01-17 NOTE — TELEPHONE ENCOUNTER
Patient will need mychart video visit.  Attempted to contact patient, no answer, straight to voicemail.  Message sent via [employer] that she will need to schedule visit to get abx.

## 2022-01-17 NOTE — TELEPHONE ENCOUNTER
Caller: Jonathan Barros    Relationship: Emergency Contact      What medication are you requesting: ANTIBIOTIC   What are your current symptoms: EARACHE WITH VERTIGO     Have you had these symptoms before:    [x] Yes  [] No    Have you been treated for these symptoms before:   [x] Yes  [] No    If a prescription is needed, what is your preferred pharmacy and phone number: [facility]/PHARMACY #[id number] - [place] - [address] [phone] [facility] [phone]

## 2022-01-31 ENCOUNTER — OFFICE VISIT (OUTPATIENT)
Dept: FAMILY MEDICINE CLINIC | Facility: CLINIC | Age: 47
End: 2022-01-31

## 2022-01-31 VITALS
HEIGHT: 65 IN | BODY MASS INDEX: 29.82 KG/M2 | SYSTOLIC BLOOD PRESSURE: 120 MMHG | HEART RATE: 91 BPM | WEIGHT: 179 LBS | DIASTOLIC BLOOD PRESSURE: 73 MMHG | RESPIRATION RATE: 16 BRPM | TEMPERATURE: 98.6 F

## 2022-01-31 DIAGNOSIS — H66.001 NON-RECURRENT ACUTE SUPPURATIVE OTITIS MEDIA OF RIGHT EAR WITHOUT SPONTANEOUS RUPTURE OF TYMPANIC MEMBRANE: Primary | ICD-10-CM

## 2022-01-31 DIAGNOSIS — E66.3 OVERWEIGHT (BMI 25.0-29.9): ICD-10-CM

## 2022-01-31 DIAGNOSIS — H65.93 FLUID LEVEL BEHIND TYMPANIC MEMBRANE OF BOTH EARS: ICD-10-CM

## 2022-01-31 PROCEDURE — 99213 OFFICE O/P EST LOW 20 MIN: CPT | Performed by: NURSE PRACTITIONER

## 2022-01-31 RX ORDER — METHYLPREDNISOLONE 4 MG/1
TABLET ORAL
Qty: 1 EACH | Refills: 0 | Status: SHIPPED | OUTPATIENT
Start: 2022-01-31 | End: 2022-03-14

## 2022-01-31 RX ORDER — FLUTICASONE PROPIONATE 50 MCG
2 SPRAY, SUSPENSION (ML) NASAL DAILY
Qty: 15.8 ML | Refills: 2 | Status: SHIPPED | OUTPATIENT
Start: 2022-01-31

## 2022-01-31 RX ORDER — AMOXICILLIN AND CLAVULANATE POTASSIUM 875; 125 MG/1; MG/1
1 TABLET, FILM COATED ORAL 2 TIMES DAILY
Qty: 20 TABLET | Refills: 0 | Status: SHIPPED | OUTPATIENT
Start: 2022-01-31 | End: 2022-03-14

## 2022-01-31 NOTE — PROGRESS NOTES
"Chief Complaint  Earache (She states her right ear is stopped up.  She states she has almost no hearing in her right ear.)    Subjective    History of Present Illness      Patient presents to [facility] for   Earache She states her right ear is stopped up. She states she has almost no hearing in her right ear.        Review of Systems   Constitutional: Negative.    HENT: Positive for congestion.    Eyes: Negative.    Respiratory: Negative.    Cardiovascular: Negative.    Gastrointestinal: Negative.    Endocrine: Negative.    Genitourinary: Negative.    Musculoskeletal: Negative.    Skin: Negative.    Allergic/Immunologic: Negative.    Neurological: Negative.    Hematological: Negative.    Psychiatric/Behavioral: Negative.        I have reviewed and agree with the HPI and ROS information as above.  Yumiko Zazueta, APRN     Objective   Vital Signs:   /73 (BP Location: Right arm, Patient Position: Sitting)   Pulse 91   Temp 98.6 °F (37 °C)   Resp 16   Ht 165.1 cm (65\")   Wt 81.2 kg (179 lb)   BMI 29.79 kg/m²       Physical Exam  Constitutional:       Appearance: Normal appearance. She is well-developed.      Comments: overweight   HENT:      Head: Normocephalic and atraumatic.      Right Ear: External ear normal. A middle ear effusion is present. Tympanic membrane is bulging.      Left Ear: External ear normal. A middle ear effusion is present.      Nose: Nose normal. No nasal tenderness or congestion.      Mouth/Throat:      Lips: Pink. No lesions.      Mouth: Mucous membranes are moist. No oral lesions.      Dentition: Normal dentition.      Pharynx: Oropharynx is clear. No pharyngeal swelling, oropharyngeal exudate or posterior oropharyngeal erythema.   Eyes:      General: Lids are normal. Vision grossly intact. No scleral icterus.        Right eye: No discharge.         Left eye: No discharge.      Extraocular Movements: Extraocular movements intact.      " Conjunctiva/sclera: Conjunctivae normal.      Right eye: Right conjunctiva is not injected.      Left eye: Left conjunctiva is not injected.      Pupils: Pupils are equal, round, and reactive to light.   Cardiovascular:      Rate and Rhythm: Normal rate and regular rhythm.      Heart sounds: Normal heart sounds. No murmur heard.  No gallop.    Pulmonary:      Effort: Pulmonary effort is normal.      Breath sounds: Normal breath sounds and air entry. No wheezing, rhonchi or rales.   Musculoskeletal:         General: No tenderness or deformity. Normal range of motion.      Cervical back: Full passive range of motion without pain, normal range of motion and neck supple.      Right lower leg: No edema.      Left lower leg: No edema.   Skin:     General: Skin is warm and dry.      Coloration: Skin is not jaundiced.      Findings: No rash.   Neurological:      Mental Status: She is alert and oriented to person, place, and time.      Cranial Nerves: Cranial nerves are intact.      Sensory: Sensation is intact.      Motor: Motor function is intact.      Coordination: Coordination is intact.      Gait: Gait is intact.   Psychiatric:         Attention and Perception: Attention normal.         Mood and Affect: Mood and affect normal.         Behavior: Behavior is not hyperactive. Behavior is cooperative.         Thought Content: Thought content normal.         Judgment: Judgment normal.          Result Review  Data Reviewed:            Assessment and Plan      Problem List Items Addressed This Visit        Endocrine and Metabolic    Overweight (BMI 25.0-29.9)      Other Visit Diagnoses     Non-recurrent acute suppurative otitis media of right ear without spontaneous rupture of tympanic membrane    -  Primary    Relevant Medications    methylPREDNISolone (MEDROL) 4 MG dose pack    amoxicillin-clavulanate (Augmentin) 875-125 MG per tablet    Fluid level behind tympanic membrane of both ears        Relevant Medications     fluticasone (Flonase) 50 MCG/ACT nasal spray        Patient here today with complaints of right ear fullness and pressure.  She feels as though her ear needs to pop.  She is having trouble hearing as well as ringing in her ears.  She is also experiencing dizziness which causes nausea.  She is taking Allegra, Flonase, and meclizine as well.  She states this has been going on for about 1 month. She states she has had episodes like this in the past several times. Fluid noted to bilateral TMs, however her right TM was bulging and the fluid was yellow in color. No erythema noted.     Plan:    1.  Start Augmentin and medrol dose pack.   2. Also recommended to continue Flonase and allergy medication daily.   3. F/u if symptoms do not improve or become worse.         Follow Up   Return if symptoms worsen or fail to improve.  Patient was given instructions and counseling regarding her condition or for health maintenance advice. Please see specific information pulled into the AVS if appropriate.

## 2022-02-15 ENCOUNTER — TELEPHONE (OUTPATIENT)
Dept: FAMILY MEDICINE CLINIC | Facility: CLINIC | Age: 47
End: 2022-02-15

## 2022-02-15 NOTE — TELEPHONE ENCOUNTER
Contacted pt back, verified name and . Advised pt need to f/u and offered to sched appt. Pt declined stated pref walk in and would do so.

## 2022-02-15 NOTE — TELEPHONE ENCOUNTER
Caller: Aysha Barros    Relationship: Self    Best call back number: [phone] (H)    Requested Prescriptions:   STEROIDS   AMOXICILLIN    Pharmacy where request should be sent:    [id number] - [place], [place] - [address] - [phone]  - [phone]   [phone]  Additional details provided by patient: STATES SHE WAS FEELING BETTER WITH VERTIGO WHEN SHE WAS TAKING THE MEDICATION AND NOW SHE IS HAVING THE SYMPTOMS     SHE IS NAUSEATED AND VOMITING DUE TO THE SYMPTOMS WITH THE VERTIGO     STATES SHE WAS SEEN FOR THE VERTIGO ABOUT 3 WEEKS AGO      Does the patient have less than a 3 day supply:  [x] Yes  [] No    Leslie Cordero Rep   02/15/22 14:01 CST

## 2022-02-22 DIAGNOSIS — F41.8 MIXED ANXIETY AND DEPRESSIVE DISORDER: ICD-10-CM

## 2022-02-22 RX ORDER — CITALOPRAM 10 MG/1
TABLET ORAL
Qty: 30 TABLET | Refills: 0 | Status: SHIPPED | OUTPATIENT
Start: 2022-02-22 | End: 2022-04-15 | Stop reason: SDUPTHER

## 2022-03-14 ENCOUNTER — OFFICE VISIT (OUTPATIENT)
Dept: FAMILY MEDICINE CLINIC | Facility: CLINIC | Age: 47
End: 2022-03-14

## 2022-03-14 VITALS
OXYGEN SATURATION: 97 % | TEMPERATURE: 97.6 F | DIASTOLIC BLOOD PRESSURE: 82 MMHG | HEART RATE: 90 BPM | HEIGHT: 65 IN | WEIGHT: 169 LBS | RESPIRATION RATE: 18 BRPM | BODY MASS INDEX: 28.16 KG/M2 | SYSTOLIC BLOOD PRESSURE: 130 MMHG

## 2022-03-14 DIAGNOSIS — T78.40XD ALLERGY, SUBSEQUENT ENCOUNTER: ICD-10-CM

## 2022-03-14 DIAGNOSIS — H65.90 FLUID COLLECTION OF MIDDLE EAR: ICD-10-CM

## 2022-03-14 DIAGNOSIS — H65.116 RECURRENT ACUTE ALLERGIC OTITIS MEDIA OF BOTH EARS: Primary | ICD-10-CM

## 2022-03-14 PROCEDURE — 99214 OFFICE O/P EST MOD 30 MIN: CPT

## 2022-03-14 PROCEDURE — 96372 THER/PROPH/DIAG INJ SC/IM: CPT

## 2022-03-14 RX ORDER — MONTELUKAST SODIUM 10 MG/1
10 TABLET ORAL NIGHTLY
Qty: 30 TABLET | Refills: 4 | Status: SHIPPED | OUTPATIENT
Start: 2022-03-14

## 2022-03-14 RX ORDER — DEXAMETHASONE SODIUM PHOSPHATE 4 MG/ML
8 INJECTION, SOLUTION INTRA-ARTICULAR; INTRALESIONAL; INTRAMUSCULAR; INTRAVENOUS; SOFT TISSUE ONCE
Status: COMPLETED | OUTPATIENT
Start: 2022-03-14 | End: 2022-03-14

## 2022-03-14 RX ORDER — CEFTRIAXONE 1 G/1
1 INJECTION, POWDER, FOR SOLUTION INTRAMUSCULAR; INTRAVENOUS EVERY 24 HOURS
Status: COMPLETED | OUTPATIENT
Start: 2022-03-14 | End: 2022-03-14

## 2022-03-14 RX ADMIN — DEXAMETHASONE SODIUM PHOSPHATE 8 MG: 4 INJECTION, SOLUTION INTRA-ARTICULAR; INTRALESIONAL; INTRAMUSCULAR; INTRAVENOUS; SOFT TISSUE at 14:29

## 2022-03-14 RX ADMIN — CEFTRIAXONE 1 G: 1 INJECTION, POWDER, FOR SOLUTION INTRAMUSCULAR; INTRAVENOUS at 14:28

## 2022-03-14 NOTE — PROGRESS NOTES
After obtaining consent, and per orders of UMER Mcguire, injection of Dexamethasone 8mg and Rocephin 1g was given IM right dorsogluteal by Radha Chow MA. Patient instructed to remain in clinic for 20 minutes afterwards, and to report any adverse reaction to me immediately. Patient tolerated well with no adverse reactions.

## 2022-03-14 NOTE — PROGRESS NOTES
"Chief Complaint  Earache (burning pain at times), Ear Fullness, Tinnitus, Gait Problem, Vomiting, and Ear Drainage    Subjective    History of Present Illness      Patient presents to [facility] for   right ear fullness, ear drainage, trouble hearing, gait problem, and tinnitus, and an intermittent burning sensation in right ear. Patient states that all symptoms presented the day after Nikolas and have not resolved. She was treated for these issues with antibiotics and steroids several weeks ago but this did not help. She also reports that if she walks for too long and does not sit down quickly she starts to vomit.        Review of Systems   HENT: Positive for ear discharge, ear pain, hearing loss and tinnitus.    Musculoskeletal: Positive for gait problem.   All other systems reviewed and are negative.      I have reviewed and agree with the HPI and ROS information as above.  Charlee Meehan, UMER     Objective   Vital Signs:   /82   Pulse 90   Temp 97.6 °F (36.4 °C)   Resp 18   Ht 165.1 cm (65\")   Wt 76.7 kg (169 lb)   SpO2 97%   BMI 28.12 kg/m²        Physical Exam  Constitutional:       Appearance: Normal appearance. She is well-developed.   HENT:      Head: Normocephalic and atraumatic.      Right Ear: Ear canal and external ear normal. A middle ear effusion is present. Tympanic membrane is erythematous.      Left Ear: Tympanic membrane, ear canal and external ear normal.      Ears:      Comments: Moderate middle ear fluid noted      Nose: Nose normal. No septal deviation, nasal tenderness or congestion.      Mouth/Throat:      Lips: Pink. No lesions.      Mouth: Mucous membranes are moist. No oral lesions.      Dentition: Normal dentition.      Pharynx: Oropharynx is clear. No pharyngeal swelling, oropharyngeal exudate or posterior oropharyngeal erythema.   Eyes:      General: Lids are normal. Vision grossly intact. No scleral icterus.        Right eye: No " discharge.         Left eye: No discharge.      Extraocular Movements: Extraocular movements intact.      Conjunctiva/sclera: Conjunctivae normal.      Right eye: Right conjunctiva is not injected.      Left eye: Left conjunctiva is not injected.      Pupils: Pupils are equal, round, and reactive to light.   Neck:      Thyroid: No thyroid mass.      Trachea: Trachea normal.   Cardiovascular:      Rate and Rhythm: Normal rate and regular rhythm.      Heart sounds: Normal heart sounds. No murmur heard.    No gallop.   Pulmonary:      Effort: Pulmonary effort is normal.      Breath sounds: Normal breath sounds and air entry. No wheezing, rhonchi or rales.   Abdominal:      General: There is no distension.      Palpations: Abdomen is soft. There is no mass.      Tenderness: There is no abdominal tenderness. There is no right CVA tenderness, left CVA tenderness, guarding or rebound.   Musculoskeletal:         General: No tenderness or deformity. Normal range of motion.      Cervical back: Full passive range of motion without pain, normal range of motion and neck supple.      Thoracic back: Normal.      Right lower leg: No edema.      Left lower leg: No edema.   Skin:     General: Skin is warm and dry.      Coloration: Skin is not jaundiced.      Findings: No rash.   Neurological:      Mental Status: She is alert and oriented to person, place, and time.      Cranial Nerves: Cranial nerves are intact.      Sensory: Sensation is intact.      Motor: Motor function is intact.      Coordination: Coordination is intact.      Gait: Gait is intact.      Deep Tendon Reflexes: Reflexes are normal and symmetric.   Psychiatric:         Mood and Affect: Mood and affect normal.         Judgment: Judgment normal.          Result Review  Data Reviewed:                   Assessment and Plan      Problem List Items Addressed This Visit    None     Visit Diagnoses     Recurrent acute allergic otitis media of both ears    -  Primary     "Relevant Medications    cefTRIAXone (ROCEPHIN) injection 1 g (Start on 3/14/2022  3:15 PM)    dexamethasone (DECADRON) injection 8 mg (Start on 3/14/2022  3:15 PM)    Fluid collection of middle ear        Allergy, subsequent encounter        Relevant Medications    montelukast (Singulair) 10 MG tablet      Patient is here today with c/o R ear pain, and feeling as if \" I have a cotton ball in my ear.\" Patient was previously seen for this and was treated with augmentin and a steroid julio cesar  But had no relief. She states that she has has this ongoing issue before and ended up having to have shots to resolve the issue. She is still taking flonase daily which I instructed to continue.     Plan   1. Rocephin and Decadron in office. Patient states that she had a cefaclor allergy when she was a kid but would like the shot and does fine with them. Educated on SE of medication and allergic reactions to watch for.   2. Singulair sent in ; patient educated to take daily.         Follow Up   Return if symptoms worsen or fail to improve.  Patient was given instructions and counseling regarding her condition or for health maintenance advice. Please see specific information pulled into the AVS if appropriate.       "

## 2022-04-01 ENCOUNTER — OFFICE VISIT (OUTPATIENT)
Dept: FAMILY MEDICINE CLINIC | Facility: CLINIC | Age: 47
End: 2022-04-01

## 2022-04-01 VITALS
SYSTOLIC BLOOD PRESSURE: 120 MMHG | OXYGEN SATURATION: 98 % | WEIGHT: 176 LBS | BODY MASS INDEX: 29.32 KG/M2 | HEIGHT: 65 IN | DIASTOLIC BLOOD PRESSURE: 80 MMHG | RESPIRATION RATE: 16 BRPM | TEMPERATURE: 98.6 F | HEART RATE: 106 BPM

## 2022-04-01 DIAGNOSIS — R42 VERTIGO: ICD-10-CM

## 2022-04-01 DIAGNOSIS — R05.9 COUGH: ICD-10-CM

## 2022-04-01 DIAGNOSIS — H91.91 DECREASED HEARING OF RIGHT EAR: ICD-10-CM

## 2022-04-01 DIAGNOSIS — J06.9 ACUTE URI: Primary | ICD-10-CM

## 2022-04-01 DIAGNOSIS — H92.01 RIGHT EAR PAIN: ICD-10-CM

## 2022-04-01 DIAGNOSIS — R09.81 NASAL CONGESTION: ICD-10-CM

## 2022-04-01 PROCEDURE — 99214 OFFICE O/P EST MOD 30 MIN: CPT

## 2022-04-01 RX ORDER — MECLIZINE HCL 12.5 MG/1
12.5 TABLET ORAL 3 TIMES DAILY PRN
Qty: 90 TABLET | Refills: 0 | Status: SHIPPED | OUTPATIENT
Start: 2022-04-01

## 2022-04-01 RX ORDER — BROMPHENIRAMINE MALEATE, PSEUDOEPHEDRINE HYDROCHLORIDE, AND DEXTROMETHORPHAN HYDROBROMIDE 2; 30; 10 MG/5ML; MG/5ML; MG/5ML
2.5 SYRUP ORAL 4 TIMES DAILY PRN
Qty: 118 ML | Refills: 0 | Status: SHIPPED | OUTPATIENT
Start: 2022-04-01 | End: 2022-10-11

## 2022-04-01 RX ORDER — CLINDAMYCIN HYDROCHLORIDE 300 MG/1
300 CAPSULE ORAL 3 TIMES DAILY
Qty: 21 CAPSULE | Refills: 0 | Status: SHIPPED | OUTPATIENT
Start: 2022-04-01 | End: 2022-04-08

## 2022-04-01 RX ORDER — METHYLPREDNISOLONE 4 MG/1
TABLET ORAL
Qty: 21 EACH | Refills: 0 | Status: SHIPPED | OUTPATIENT
Start: 2022-04-01 | End: 2022-05-11

## 2022-04-01 NOTE — PROGRESS NOTES
"Chief Complaint  Sore Throat and Earache (She states she has a full and burning feeling in her right ear.)    Subjective    History of Present Illness      Patient presents to [facility] for   Sore Throat      Earache She states she has a full and burning feeling in her right ear.     Sore Throat      Earache She states she has a full and burning feeling in her right ear.            Review of Systems   HENT: Positive for congestion, ear pain and sore throat.    Neurological: Positive for dizziness.   All other systems reviewed and are negative.      I have reviewed and agree with the HPI and ROS information as above.  Charlee Meehan, UMER     Objective   Vital Signs:   /80   Pulse 106   Temp 98.6 °F (37 °C)   Resp 16   Ht 165.1 cm (65\")   Wt 79.8 kg (176 lb)   SpO2 98%   BMI 29.29 kg/m²     Physical Exam  Constitutional:       Appearance: Normal appearance. She is well-developed.   HENT:      Head: Normocephalic and atraumatic.      Right Ear: Ear canal and external ear normal. Decreased hearing noted. A middle ear effusion is present.      Left Ear: Ear canal and external ear normal. A middle ear effusion is present.      Nose: Nose normal. No septal deviation, nasal tenderness or congestion.      Mouth/Throat:      Lips: Pink. No lesions.      Mouth: Mucous membranes are moist. No oral lesions.      Dentition: Normal dentition.      Pharynx: Oropharynx is clear. No pharyngeal swelling, oropharyngeal exudate or posterior oropharyngeal erythema.   Eyes:      General: Lids are normal. Vision grossly intact. No scleral icterus.        Right eye: No discharge.         Left eye: No discharge.      Extraocular Movements: Extraocular movements intact.      Conjunctiva/sclera: Conjunctivae normal.      Right eye: Right conjunctiva is not injected.      Left eye: Left conjunctiva is not injected.      Pupils: Pupils are equal, round, and reactive to light.   Neck:      Thyroid: No " thyroid mass.      Trachea: Trachea normal.   Cardiovascular:      Rate and Rhythm: Normal rate and regular rhythm.      Heart sounds: Normal heart sounds. No murmur heard.    No gallop.   Pulmonary:      Effort: Pulmonary effort is normal.      Breath sounds: Normal breath sounds and air entry. No wheezing, rhonchi or rales.   Abdominal:      General: There is no distension.      Palpations: Abdomen is soft. There is no mass.      Tenderness: There is no abdominal tenderness. There is no right CVA tenderness, left CVA tenderness, guarding or rebound.   Musculoskeletal:         General: No tenderness or deformity. Normal range of motion.      Cervical back: Full passive range of motion without pain, normal range of motion and neck supple.      Thoracic back: Normal.      Right lower leg: No edema.      Left lower leg: No edema.   Skin:     General: Skin is warm and dry.      Coloration: Skin is not jaundiced.      Findings: No rash.   Neurological:      Mental Status: She is alert and oriented to person, place, and time.      Cranial Nerves: Cranial nerves are intact.      Sensory: Sensation is intact.      Motor: Motor function is intact.      Coordination: Coordination is intact.      Gait: Gait is intact.      Deep Tendon Reflexes: Reflexes are normal and symmetric.   Psychiatric:         Mood and Affect: Mood and affect normal.         Judgment: Judgment normal.            Result Review  Data Reviewed:                   Assessment and Plan      Problem List Items Addressed This Visit    None     Visit Diagnoses     Acute URI    -  Primary    Relevant Medications    clindamycin (Cleocin) 300 MG capsule    methylPREDNISolone (MEDROL) 4 MG dose pack    Cough        Relevant Medications    brompheniramine-pseudoephedrine-DM 30-2-10 MG/5ML syrup    Nasal congestion        Relevant Medications    clindamycin (Cleocin) 300 MG capsule    methylPREDNISolone (MEDROL) 4 MG dose pack    Right ear pain        Relevant Orders     Ambulatory Referral to ENT (Otolaryngology)    Vertigo        Relevant Medications    meclizine (ANTIVERT) 12.5 MG tablet    Other Relevant Orders    Ambulatory Referral to ENT (Otolaryngology)    Decreased hearing of right ear        Relevant Orders    Ambulatory Referral to ENT (Otolaryngology)      Patient is seen today with c/o increased congestion, worsen cough and right ear pain. Patient was seen on 03/14 for these symptoms and treated with abx shot and steroid. Patient states that she did not have much improvement with this. She states that she also has had vertigo recently and is fixing to travel and would like medication for this. She is very hoarse in the exam room today due to post nasal drip and coughing. Patient denies any fever. Patient has also been treated for otitis media as well and has had issues with her ears on going since December. Patient BP was also high today; I  rechecked this myself and it was 120/80 manually. Educated patient to spot check BP at times and if increased to let us know.     Plan  1.  Bromfed for cough   2. Clindamycin and Medrol dose pack; patient education done   3. Meclizine for vertigo symptoms   4. Referral to ENT for ongoing ear fullness and decreased hearing even with treatment.   5. Discussed with patient to continue use of Flonase and Singulair due to reoccurrence of URI.       Follow Up   Return if symptoms worsen or fail to improve.  Patient was given instructions and counseling regarding her condition or for health maintenance advice. Please see specific information pulled into the AVS if appropriate.

## 2022-04-15 DIAGNOSIS — F41.8 MIXED ANXIETY AND DEPRESSIVE DISORDER: ICD-10-CM

## 2022-04-15 RX ORDER — CITALOPRAM 10 MG/1
10 TABLET ORAL DAILY
Qty: 30 TABLET | Refills: 0 | Status: SHIPPED | OUTPATIENT
Start: 2022-04-15 | End: 2022-05-16

## 2022-04-15 NOTE — TELEPHONE ENCOUNTER
Caller: Aysha Barros    Relationship: Self    Best call back number: [phone]    Requested Prescriptions:   Requested Prescriptions     Pending Prescriptions Disp Refills   • citalopram (CeleXA) 10 MG tablet 30 tablet 0     Sig: Take 1 tablet by mouth Daily.        Pharmacy where request should be sent: [id number] - [place], [place] - [address] [facility] [phone] [facility] [phone] FX     Does the patient have less than a 3 day supply:  [x] Yes  [] No    Leslie Gotti Rep   04/15/22 15:53 CDT

## 2022-05-11 ENCOUNTER — OFFICE VISIT (OUTPATIENT)
Dept: OTOLARYNGOLOGY | Facility: CLINIC | Age: 47
End: 2022-05-11

## 2022-05-11 VITALS — HEART RATE: 100 BPM | DIASTOLIC BLOOD PRESSURE: 84 MMHG | TEMPERATURE: 98.6 F | SYSTOLIC BLOOD PRESSURE: 133 MMHG

## 2022-05-11 DIAGNOSIS — H91.90 DECREASED HEARING, UNSPECIFIED LATERALITY: ICD-10-CM

## 2022-05-11 DIAGNOSIS — H92.01 RIGHT EAR PAIN: ICD-10-CM

## 2022-05-11 DIAGNOSIS — H93.8X1 EAR FULLNESS, RIGHT: ICD-10-CM

## 2022-05-11 DIAGNOSIS — H93.11 TINNITUS OF RIGHT EAR: ICD-10-CM

## 2022-05-11 DIAGNOSIS — R42 VERTIGO: Primary | ICD-10-CM

## 2022-05-11 PROCEDURE — 99214 OFFICE O/P EST MOD 30 MIN: CPT | Performed by: NURSE PRACTITIONER

## 2022-05-11 RX ORDER — GLYCOPYRROLATE 1 MG/1
1 TABLET ORAL 3 TIMES DAILY PRN
Qty: 60 TABLET | Refills: 3 | Status: SHIPPED | OUTPATIENT
Start: 2022-05-11 | End: 2022-06-10

## 2022-05-11 NOTE — PROGRESS NOTES
UMER Alejandre  [facility]  [address], [address]  [place] [address]  Fax [phone]  Phone [phone]      Visit Type: NEW PATIENT   Chief Complaint   Patient presents with   • Ear Problem          • Dizziness        HPI  Aysha Barros is a 46 y.o. female who presents for evaluation of ENT complaints.  She has had complaints of burning otalgia, ear pressure, ear fullness, tinnitus, muffled hearing, and vertigo.  Her symptoms are localized to the right ear.  She reports any time she gets a cold or upper respiratory infection it typically goes to her right ear.  This has been intermittent for many years but usually resolves in a couple weeks with medications.  Her most recent flareup of symptoms began last December and has been persistent.  She has been treated with antibiotics, steroids, Flonase, Allegra-D, and Singulair without improvement.    She also reports a history of vertigo.  This has been intermittent for many years.  It occurs 4-5 times per year and last approximately 24 to 48 hours in duration.  She has associated nausea and vomiting.  Her symptoms are aggravated with change in head position.  She has tried meclizine but states this just makes her sleepy.    She also complains of right tinnitus.  It varies in tone- it is sometimes high-pitched and sometimes a low pitched and roaring.     She has had episodic vertigo, right ear fullness, fluctuation in hearing, roaring tinnitus.    She has noticed her symptoms worsen after alcohol intake.    Past Medical History:   Diagnosis Date   • Anxiety    • Depression        History reviewed. No pertinent surgical history.    Family History: Her family history is not on file.     Social History: She  reports that she has never smoked. She has never used smokeless tobacco. Alcohol use questions deferred to the physician. Drug use questions deferred to the physician.    Home  Medications:  brompheniramine-pseudoephedrine-DM, busPIRone, citalopram, fluticasone, glycopyrrolate, meclizine, and montelukast    Allergies:  She is allergic to cefaclor.       Vital Signs:     /84   Pulse 100   Temp 98.6 °F (37 °C) (Temporal)     ENT Physical Exam  Constitutional  Appearance: patient appears well-developed, well-nourished and well-groomed,  Communication/Voice: communication appropriate for developmental age; vocal quality normal;  Head and Face  Appearance: head appears normal and face appears atraumatic;  Ear  Auricles: right auricle normal; left auricle normal;  External Mastoids: right external mastoid normal; left external mastoid normal;  Ear Canals: right ear canal normal; left ear canal normal;  Tympanic Membranes: right tympanic membrane normal; left tympanic membrane normal;  Nose  External Nose: nares patent bilaterally;  Oral Cavity/Oropharynx  Lips: normal;  Neurovestibular  Mental Status: alert and oriented;  Psychiatric: mood normal; affect is appropriate;        Result Review    RESULTS REVIEW    I have reviewed the patients old records in the chart.    Assessment & Plan    Diagnoses and all orders for this visit:    1. Vertigo (Primary)  -     Comprehensive Hearing Test; Future    2. Tinnitus of right ear  -     Comprehensive Hearing Test; Future    3. Decreased hearing, unspecified laterality  -     Comprehensive Hearing Test; Future    4. Ear fullness, right  -     Comprehensive Hearing Test; Future    5. Right ear pain  -     Comprehensive Hearing Test; Future    Other orders  -     glycopyrrolate (ROBINUL) 1 MG tablet; Take 1 tablet by mouth 3 (Three) Times a Day As Needed (vertigo) for up to 30 days.  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  Follow-up with audiogram.  May try Robinul.  Follow a low salt diet to try to prevent inner ear fluid accumulation.  Decrease caffeine, avoid red wine, nitrites in cured meats, food additives (like  monosodium glutamate/ MSG) and dyes.  May consider trial of Dyazide if no improvement.  She was instructed to call or return should any problems arise prior to next office visit.    Return in about 6 weeks (around 6/22/2022) for Recheck, With Audio.      Anjana Steinberg APRN

## 2022-05-11 NOTE — PATIENT INSTRUCTIONS
MENIERE’S DISEASE    Description:  Meniere’s disease is a disorder of the inner ear.  The disease appears to be related to a failure of the mechanism regulating the production, circulation, and/or absorption of endolymph.  The cause of Meniere’s disease is unknown.  Proposed theories of cochlear dysfunction in patients with Meniere’s disease include mechanical, metabolic, and/or biochemical alterations.  Clinically, the classic symptoms of Meniere’s disease are recurrent episodes of vertigo, fluctuating sensorineural hearing loss, and roaring tinnitus.    Histopathologic observations on temporal bones from individuals with Meniere’s disease have consistently shown distention of the endolymphatic system.  Dilatation of the membranous labyrinth may be accompanied by herniations, ruptures, fistulas, and/or total collapse.  The organ of Corti  appears normal in some specimens and abnormal in others (Kimura et al., 1976).  In general, morphologic changes in the organ of Corti have been difficult to ascribe exclusively to Meniere’s disease because similar changes may be found in non-Meniere’s specimens, such as individuals with presbyacusis (Kimura et al., 1976).    Patient Characteristics:  The initial onset of symptoms is between 40 and 60 years of age in about 50% of individuals.  The disease is rate in children.  The hearing loss is unilateral in approximately 80% of individuals.  A hereditary predisposition for Meniere’s disease may be indicated by occasional occurrences of episodic vertigo and hearing loss in families (Juares, 1965).  A sex ration for Meniere’s disease is disputed.    Clinical Course:  The onset of Meniere’s disease is typically characterized by a sudden episode of vertigo accompanied by nausea and vomiting.  The onset of vertigo may be preceded by feelings of pressure or fullness in the ear.  Briefly before or during the vertiginous episode, a loud roaring or ringing tinnitus may become  apparent.  Hearing sensitivity in the affected ear may decrease.  However, information about hearing sensitivity during the vertiginous episode may be difficult to obtain because the symptoms of vertigo and loud, roaring tinnitus may be relatively more dramatic.    Following the acute episode, the vertigo may cease or subside into a sensation of unsteadiness.  Feelings of unsteadiness may last for a period of days.  The attack usually involves no neurologic symptoms other than those referable to the end organ.  At the end of the attack, hearing sensitivity may improve.  Tinnitus may lessen.    The initial attack of vertigo is characteristically followed by a period of remission.  During this time, the patient may notice fluctuation in his hearing ability, in the tinnitus, and in the feelings of pressure or fullness within the ear.  Subsequently, the above symptoms may be interrupted or exacerbated by another episode of spontaneous vertigo.  The characteristic frequency of vertiginous attacks is variable among patients.      Gradually, a longer period of spontaneous or treatment-induced remission occurs.  During remission, no symptoms may be noted other than a loss of hearing in the involved ear. The clinical course is subsequently characterized by active periods of variable length interspersed with remission periods of variable length.  Over time, the symptoms of vertigo, nausea, and vomiting tend to become less severe.  In contrast, the hearing loss and tinnitus may become progressively worse (Aye and Prashant, 1976).    Treatment may involve drug therapy, diet modification, and/or surgery in patients with incapacitating and unsuccessfully treated vertiginous attacks.    Site of Disorder:      [place].    General Audiologic Pattern:  Pure tone sensitivity results characteristically show a unilateral, fluctuating sensorineural hearing loss.  During the initial sates of Meniere’s disease, the degree of loss may  vary.  However, over a period of years, the degree of loss usually progresses to a moderate to severe, permanent impairment.  On rare occasions, a total loss of hearing may occur.    The audiometric configuration may vary with the time course of the disease.  In the initial stages of Meniere’s disease, the audiometric contour may be rising, with greater loss in the low frequency region than in the mid and high frequency regions.   Subsequently, the audiometric configuration may change to a relatively flat contour with approximately the same degree of sensitivity loss at all frequencies.  During a later stage of Meniere’s disease, the audiogram may present a downwardly sloping configuration with greater loss in the high frequency region than in the low frequency region.    Maximum speech intelligibility scores are usually reduced in a manner consistent with the degree and configuration of sensitivity loss.    Impedance results characteristically show normal, type A, tympanograms and normal static compliance measures.  Acoustic reflexes are characteristically present at normal HLs and reduced sensation levels.  No reflex decay is observed at 500 Hz or 1000 Hz.    In some patients with Meniere’s disease, the loudness discomfort level or the intensity level where sounds are first reported as annoying may occur at a reduced hearing level relative to the normal ear.  Patients may report intolerance to loud sounds.  Consequently, auditory tests at loud intensity levels may not e obtainable.  In addition, patients may complain of diplacusis or a difference of pitch sensation in the involved ear relative to the normal ear.         5    00 Milligrams or 22 Mill equivalent Sodium Diet    All foods on this diet should be cooked and served without the addition of salt or other sodium compounds.    This diet meets the 1989 Recommended Daily Dietary Allowance when the types of food and amounts suggested are included daily:    2 or  more cups of milk or substitute for adults  2 or more servings of meat, fish, poultry, eggs, or cheese  4 or more servings of bread and/or cereal (whole grain or  enriched)  4 or more servings of vegetables and fruits (include citrus daily and a dark green or deep yellow vegetable every other day)    Foods  Allowed     Not allowed    Beverages Milk, 2 cups only, including that used in   Buttermilk, milk shakes, malted milk, commercial   cooking.  Whole, 2% skim, evaporated  eggnog. Instant cocoa mixes, carbonated beverages,    milk, reconstituted dried milk.  Other  prepared beverage mixes, including fruit flavoring    beverages as desired.  Cocoa, coffee  powder, Mauritian process cocoa, fountain beverages.    substitutes, decaffeinated coffee, tea,     some carbonated (consult your dietician.)    Bread  Low sodium break, unsalted crackers,  All bread or bakery goods prepared with baking    unsalted bob toast.    powder, baking soda, or salt, salted crackers, pretzels,          self-rising flour, prepared mixes, self-rising meal.    Cereals  Cooked cereal prepared without salt,  Cereals containing more than 25mg sodium.    dry or instant cereals containing 25mg of    sodium or less. (Ask dietician for list of    brand names).    Desserts  As desired. Fruits, gelatin desserts made  Blackstrap molasses.    with low sodium gelatin powder, unsalted    custard and pudding made from milk and    egg allowance, fruit plea made with unsalted    crust, marshmallows, hard candy, jelly beans.    For other desserts, ask dietitian for brand names.    Egg  One only, prepared any way without salt.    Fat  As desired. Unsalted butter, unsalted margarine, Salted butter, salted margarine, spring, pork, ham    hydrogenated fat, vegetable oil, low sodium  hock, salted commercial salad dressing or mayonnaise.    Dietetic salad dressing, cream (limit 2tbs.)        Fruit and All fresh, frozen or canned fruit unless listed  Canned, dried, or  frozen fruit, which have salt or sodium  Fruit Juices under “Not Allowed”.    benzoate added, crystallized or glazed fruit, maraschino          cherries, tomato, V-8 or other vegetable juices unless low          sodium.                    Meat, Fish, 4 ounces only, prepared any way without  Canned, salted or smoked meat (spring, chipped or  Poultry,Cheese salt, beef, lamb, pork, veal, chicken, turkey,  corned beef, frankfurters, ham, kosher meats, luncheon    fresh or unsalted frozen fish, low sodium  meats, salt pork, sausage, smoked tongue, most frozen    tuna or salmon, unsalted cottage cheese,  fish,(anchovies, salt cod, herring, sardines, etc.) canned    5 tsp peanut butter may be substituted for  tuna or salmon unless low sodium, shellfish (clams,    one ounce of meat.    Crab, lobster, scallops, shrimp) kidney, cheese except          unsalted cottage cheese and low sodium cheese.    Potato or Prepared any way without salt. White or  Potato chips or other snack chips, instant potatoes,  Potato Sub sweet potatoes, yams, macaroni, noodles,  convenience potatoes, convenience rice or noodle    spaghetti, rice     products    Soup  As desired. Salt-free broth or cream   Soup made with salt or sodium compounds, canned    soup made with milk allowance and  soup unless low bouillon cubes, dried soup.    allowed vegetable, low sodium canned    soup.    Vegetables Vegetables prepared any way without  Fresh, frozen or canned artichokes, beets, best greens,    salt.  All fresh frozen or low sodium  carrots, celery, hominy, kale, mustard greens, mixed    canned vegetables unless listed under  vegetables, parsley, sauerkraut, spinach, turnips (white).    “Not allowed”.     frozen lima beans, peas, dried split peas.  All regular          canned vegetables.  All tomato juice or vegetable juice.    Miscellaneous As desired.  All spices, basil, bay leaves,  Salt, baking soda, baking powder, celery seed, celery salt,    chili  powder, cinnamon, cloves, cocoa,  onion salt, mixed seasoned salt, mono-sodium glutamate,    andrade, coconut, dill, garlic, garlic powder  catsup, chili sauce, barbecue sauce, prepared mustard, spring    manisha, mace, dry mustard powder, oregano  bits, prepared horseradish, soy sauce, olives, [facility]    paprika, pepper, poultry seasoning, junie,  sauce, steak sauce, Kitchen Bouquet, meat extracts, gravies,  thyme, turmeric, maira, poppy and sesame tenderizers, pickles, relish, salted nuts, cooking brigette     seeds, lemon juice, vinegar, flavoring extracts, containing salt, seasoned cooking mixes.    cream of tartar, yeast, unsalted nuts, unsalted    popcorn.

## 2022-05-13 ENCOUNTER — TELEPHONE (OUTPATIENT)
Dept: OTOLARYNGOLOGY | Facility: CLINIC | Age: 47
End: 2022-05-13

## 2022-05-13 NOTE — TELEPHONE ENCOUNTER
Caller: JUDY JOHNSON    Relationship: SELF    Best call back number: [phone]    What medication are you requesting: BLOOD PRESSURE USED TO REDUCE FLUID BUILD UP    What are your current symptoms: Ménière's disease    If a prescription is needed, what is your preferred pharmacy and phone number:  [id number] - [place], [place] - [address] AT ACROSS FROM [address]  [phone] [facility] [phone]      Additional notes: SAW SEGUN ADAME 5/11/22. PT STATES SHE DID RECEIVE PRESCRIPTION FOR VERTIGO BUT THOUGHT THERE WAS GOING TO BE A 2ND PRESCRIPTION CALLED IN AS WELL.

## 2022-05-15 DIAGNOSIS — F41.8 MIXED ANXIETY AND DEPRESSIVE DISORDER: ICD-10-CM

## 2022-05-16 RX ORDER — CITALOPRAM 10 MG/1
TABLET ORAL
Qty: 30 TABLET | Refills: 0 | Status: SHIPPED | OUTPATIENT
Start: 2022-05-16 | End: 2022-10-11 | Stop reason: SDUPTHER

## 2022-10-11 ENCOUNTER — OFFICE VISIT (OUTPATIENT)
Dept: FAMILY MEDICINE CLINIC | Facility: CLINIC | Age: 47
End: 2022-10-11

## 2022-10-11 VITALS
HEART RATE: 110 BPM | HEIGHT: 65 IN | RESPIRATION RATE: 18 BRPM | SYSTOLIC BLOOD PRESSURE: 168 MMHG | WEIGHT: 179 LBS | BODY MASS INDEX: 29.82 KG/M2 | DIASTOLIC BLOOD PRESSURE: 90 MMHG

## 2022-10-11 DIAGNOSIS — G43.809 OTHER MIGRAINE WITHOUT STATUS MIGRAINOSUS, NOT INTRACTABLE: Primary | ICD-10-CM

## 2022-10-11 DIAGNOSIS — F41.8 MIXED ANXIETY AND DEPRESSIVE DISORDER: ICD-10-CM

## 2022-10-11 DIAGNOSIS — E66.3 OVERWEIGHT (BMI 25.0-29.9): ICD-10-CM

## 2022-10-11 PROCEDURE — 99214 OFFICE O/P EST MOD 30 MIN: CPT | Performed by: NURSE PRACTITIONER

## 2022-10-11 RX ORDER — TOPIRAMATE 50 MG/1
TABLET, FILM COATED ORAL
Qty: 60 TABLET | Refills: 2 | Status: SHIPPED | OUTPATIENT
Start: 2022-10-11

## 2022-10-11 RX ORDER — RIZATRIPTAN BENZOATE 10 MG/1
10 TABLET ORAL ONCE AS NEEDED
Qty: 9 TABLET | Refills: 2 | Status: SHIPPED | OUTPATIENT
Start: 2022-10-11

## 2022-10-11 RX ORDER — CITALOPRAM 10 MG/1
10 TABLET ORAL DAILY
Qty: 30 TABLET | Refills: 2 | Status: SHIPPED | OUTPATIENT
Start: 2022-10-11 | End: 2023-01-06 | Stop reason: SDUPTHER

## 2022-10-11 NOTE — PROGRESS NOTES
"Chief Complaint  Migraine    Subjective        Aysha Barros presents to [facility]  History of Present Illness  Pt is here today with complaints of migraines x 1 month. Migraines are typically behind eyes, can cause nausea/vomitting. Pt has been taking Excedrin migraine and Motrin.   Pt states she used to take Topamax for migraines and would like to discuss being prescribed it again.      Objective   Vital Signs:  /90 (BP Location: Left arm, Patient Position: Sitting, Cuff Size: Adult)   Pulse 110   Resp 18   Ht 165.1 cm (65\")   Wt 81.2 kg (179 lb)   BMI 29.79 kg/m²   Estimated body mass index is 29.79 kg/m² as calculated from the following:    Height as of this encounter: 165.1 cm (65\").    Weight as of this encounter: 81.2 kg (179 lb).    BMI is >= 25 and <30. (Overweight) The following options were offered after discussion;: weight loss educational material (shared in after visit summary)      Physical Exam  Constitutional:       Appearance: Normal appearance. She is well-developed.      Comments: overweight   HENT:      Head: Normocephalic and atraumatic.      Right Ear: External ear normal.      Left Ear: External ear normal.      Nose: Nose normal. No nasal tenderness or congestion.      Mouth/Throat:      Lips: Pink. No lesions.      Mouth: Mucous membranes are moist. No oral lesions.      Dentition: Normal dentition.      Pharynx: Oropharynx is clear. No pharyngeal swelling, oropharyngeal exudate or posterior oropharyngeal erythema.   Eyes:      General: Lids are normal. Vision grossly intact. No scleral icterus.        Right eye: No discharge.         Left eye: No discharge.      Extraocular Movements: Extraocular movements intact.      Conjunctiva/sclera: Conjunctivae normal.      Right eye: Right conjunctiva is not injected.      Left eye: Left conjunctiva is not injected.      Pupils: Pupils are equal, round, and reactive to light.   Cardiovascular:      Rate and " Rhythm: Normal rate and regular rhythm.      Heart sounds: Normal heart sounds. No murmur heard.    No gallop.   Pulmonary:      Effort: Pulmonary effort is normal.      Breath sounds: Normal breath sounds and air entry. No wheezing, rhonchi or rales.   Musculoskeletal:         General: No tenderness or deformity. Normal range of motion.      Cervical back: Full passive range of motion without pain, normal range of motion and neck supple.      Right lower leg: No edema.      Left lower leg: No edema.   Skin:     General: Skin is warm and dry.      Coloration: Skin is not jaundiced.      Findings: No rash.   Neurological:      Mental Status: She is alert and oriented to person, place, and time.      Cranial Nerves: Cranial nerves are intact.      Sensory: Sensation is intact.      Motor: Motor function is intact.      Coordination: Coordination is intact.      Gait: Gait is intact.   Psychiatric:         Attention and Perception: Attention normal.         Mood and Affect: Mood and affect normal.         Behavior: Behavior is not hyperactive. Behavior is cooperative.         Thought Content: Thought content normal.         Judgment: Judgment normal.        Result Review :             Assessment and Plan   Diagnoses and all orders for this visit:    1. Other migraine without status migrainosus, not intractable (Primary)  -     rizatriptan (Maxalt) 10 MG tablet; Take 1 tablet by mouth 1 (One) Time As Needed for Migraine. May repeat in 2 hours if needed  Dispense: 9 tablet; Refill: 2  -     topiramate (Topamax) 50 MG tablet; Take 1 tab po Qhs x 1 week then take 1 tab po BID  Dispense: 60 tablet; Refill: 2    2. Mixed anxiety and depressive disorder  -     citalopram (CeleXA) 10 MG tablet; Take 1 tablet by mouth Daily.  Dispense: 30 tablet; Refill: 2    3. Overweight (BMI 25.0-29.9)      Patient here today with complaints of a migraine headache.  She states she has had a headache for the past 3 days.  She used to take  Topamax in the past and did very well with this.  She did stop taking it because it did alter her taste buds.  However, she would like to restart this at this time.  She is also requesting refills of Celexa.  She feels that her anxiety depression symptoms are stable.    Plan:    1.  Restart Topamax working up to 50 mg twice daily, refill sent.  2.  We will trial Maxalt 10 mg as needed for migraines.  Medication counseling done and dosing instructions discussed.  3.  Refill sent of Celexa 10 mg.  4.  Follow-up if symptoms do not improve or become worse.        Follow Up   Return in about 3 months (around 1/11/2023), or if symptoms worsen or fail to improve, for Recheck.  Patient was given instructions and counseling regarding her condition or for health maintenance advice. Please see specific information pulled into the AVS if appropriate.

## 2022-12-24 DIAGNOSIS — G43.809 OTHER MIGRAINE WITHOUT STATUS MIGRAINOSUS, NOT INTRACTABLE: ICD-10-CM

## 2022-12-27 RX ORDER — TOPIRAMATE 50 MG/1
TABLET, FILM COATED ORAL
Qty: 180 TABLET | OUTPATIENT
Start: 2022-12-27

## 2023-01-06 ENCOUNTER — OFFICE VISIT (OUTPATIENT)
Dept: FAMILY MEDICINE CLINIC | Facility: CLINIC | Age: 48
End: 2023-01-06
Payer: COMMERCIAL

## 2023-01-06 VITALS
TEMPERATURE: 97.7 F | HEART RATE: 97 BPM | BODY MASS INDEX: 28.99 KG/M2 | SYSTOLIC BLOOD PRESSURE: 136 MMHG | DIASTOLIC BLOOD PRESSURE: 81 MMHG | WEIGHT: 174 LBS | OXYGEN SATURATION: 94 % | HEIGHT: 65 IN

## 2023-01-06 DIAGNOSIS — F41.8 MIXED ANXIETY AND DEPRESSIVE DISORDER: ICD-10-CM

## 2023-01-06 DIAGNOSIS — R05.1 ACUTE COUGH: ICD-10-CM

## 2023-01-06 DIAGNOSIS — E66.3 OVERWEIGHT (BMI 25.0-29.9): ICD-10-CM

## 2023-01-06 DIAGNOSIS — J06.9 UPPER RESPIRATORY TRACT INFECTION, UNSPECIFIED TYPE: Primary | ICD-10-CM

## 2023-01-06 DIAGNOSIS — R49.0 HOARSENESS OF VOICE: ICD-10-CM

## 2023-01-06 PROCEDURE — 99214 OFFICE O/P EST MOD 30 MIN: CPT | Performed by: NURSE PRACTITIONER

## 2023-01-06 PROCEDURE — 96372 THER/PROPH/DIAG INJ SC/IM: CPT | Performed by: NURSE PRACTITIONER

## 2023-01-06 RX ORDER — DEXTROMETHORPHAN HYDROBROMIDE AND PROMETHAZINE HYDROCHLORIDE 15; 6.25 MG/5ML; MG/5ML
5 SYRUP ORAL NIGHTLY PRN
Qty: 118 ML | Refills: 0 | Status: SHIPPED | OUTPATIENT
Start: 2023-01-06

## 2023-01-06 RX ORDER — GLYCOPYRROLATE 1 MG/1
TABLET ORAL TAKE AS DIRECTED
COMMUNITY
Start: 2022-12-16 | End: 2023-02-16

## 2023-01-06 RX ORDER — AZITHROMYCIN 250 MG/1
TABLET, FILM COATED ORAL
Qty: 6 TABLET | Refills: 0 | Status: SHIPPED | OUTPATIENT
Start: 2023-01-06 | End: 2023-02-16

## 2023-01-06 RX ORDER — DEXAMETHASONE SODIUM PHOSPHATE 4 MG/ML
8 INJECTION, SOLUTION INTRA-ARTICULAR; INTRALESIONAL; INTRAMUSCULAR; INTRAVENOUS; SOFT TISSUE ONCE
Status: COMPLETED | OUTPATIENT
Start: 2023-01-06 | End: 2023-01-06

## 2023-01-06 RX ORDER — CITALOPRAM 20 MG/1
20 TABLET ORAL DAILY
Qty: 30 TABLET | Refills: 2 | Status: SHIPPED | OUTPATIENT
Start: 2023-01-06

## 2023-01-06 RX ADMIN — DEXAMETHASONE SODIUM PHOSPHATE 8 MG: 4 INJECTION, SOLUTION INTRA-ARTICULAR; INTRALESIONAL; INTRAMUSCULAR; INTRAVENOUS; SOFT TISSUE at 11:55

## 2023-01-06 NOTE — PROGRESS NOTES
After obtaining consent, and per orders of Hilary GARCIA, injection of  given by Faith Santiago LPN. Patient instructed to remain in clinic for 20 minutes afterwards, and to report any adverse reaction to me immediately. Pt tolerated well with no adverse reactions.

## 2023-01-06 NOTE — PROGRESS NOTES
Chief Complaint  Cough (Lasting a week./OTC medication use benadryl ) and Nasal Congestion    Subjective        Aysha Barros presents to [facility]  History of Present Illness  Cough Lasting a week. OTC medication use benadryl      Nasal Congestion            Objective   Vital Signs:  /81   Pulse 97   Temp 97.7 °F (36.5 °C)   Ht 165.1 cm (65\")   Wt 78.9 kg (174 lb)   SpO2 94%   BMI 28.96 kg/m²   Estimated body mass index is 28.96 kg/m² as calculated from the following:    Height as of this encounter: 165.1 cm (65\").    Weight as of this encounter: 78.9 kg (174 lb).    BMI is >= 25 and <30. (Overweight) The following options were offered after discussion;: weight loss educational material (shared in after visit summary)      Physical Exam  Constitutional:       Appearance: Normal appearance. She is well-developed.      Comments: overweight   HENT:      Head: Normocephalic and atraumatic.      Right Ear: External ear normal.      Left Ear: External ear normal.      Nose: Nose normal. No nasal tenderness or congestion.      Mouth/Throat:      Lips: Pink. No lesions.      Mouth: Mucous membranes are moist. No oral lesions.      Dentition: Normal dentition.      Pharynx: Oropharynx is clear. Posterior oropharyngeal erythema present. No pharyngeal swelling or oropharyngeal exudate.   Eyes:      General: Lids are normal. Vision grossly intact. No scleral icterus.        Right eye: No discharge.         Left eye: No discharge.      Extraocular Movements: Extraocular movements intact.      Conjunctiva/sclera: Conjunctivae normal.      Right eye: Right conjunctiva is not injected.      Left eye: Left conjunctiva is not injected.      Pupils: Pupils are equal, round, and reactive to light.   Cardiovascular:      Rate and Rhythm: Normal rate and regular rhythm.      Heart sounds: Normal heart sounds. No murmur heard.    No gallop.   Pulmonary:      Effort: Pulmonary effort is normal.       Breath sounds: Normal breath sounds and air entry. No wheezing, rhonchi or rales.   Musculoskeletal:         General: No tenderness or deformity. Normal range of motion.      Cervical back: Full passive range of motion without pain, normal range of motion and neck supple.      Right lower leg: No edema.      Left lower leg: No edema.   Skin:     General: Skin is warm and dry.      Coloration: Skin is not jaundiced.      Findings: No rash.   Neurological:      Mental Status: She is alert and oriented to person, place, and time.      Sensory: Sensation is intact.      Motor: Motor function is intact.      Coordination: Coordination is intact.      Gait: Gait is intact.   Psychiatric:         Attention and Perception: Attention normal.         Mood and Affect: Mood and affect normal.         Behavior: Behavior is not hyperactive. Behavior is cooperative.         Thought Content: Thought content normal.         Judgment: Judgment normal.        Result Review :             Assessment and Plan   Diagnoses and all orders for this visit:    1. Upper respiratory tract infection, unspecified type (Primary)  -     dexamethasone (DECADRON) injection 8 mg  -     azithromycin (Zithromax Z-Kenneth) 250 MG tablet; Take 2 tablets by mouth on day 1, then 1 tablet daily on days 2-5  Dispense: 6 tablet; Refill: 0    2. Acute cough  -     promethazine-dextromethorphan (PROMETHAZINE-DM) 6.25-15 MG/5ML syrup; Take 5 mL by mouth At Night As Needed for Cough.  Dispense: 118 mL; Refill: 0    3. Hoarseness of voice    4. Mixed anxiety and depressive disorder  -     citalopram (CeleXA) 20 MG tablet; Take 1 tablet by mouth Daily.  Dispense: 30 tablet; Refill: 2    5. Overweight (BMI 25.0-29.9)      Patient here today with complaints of cough, sinus congestion, hoarseness, and chest tightness.  She states her symptoms began on Nikolas Day.  She states that her cough is dry at times, but can be productive at times.  She denies any fever, chills,  or body aches.  She did take a home COVID test which was negative.  Erythema noted to throat on exam.  Fluid noted behind bilateral TMs, not infected.    Patient is also requesting refills of Celexa.  She states she was prescribed the 10 mg dose by mistake and should be taking 20 mg.    Plan:    1.  Offered to collect swabs for flu, COVID, or respiratory panel.  Patient declines and would like treatment only.  2.  Steroid injection given in office.  3.  Z-Kenneth sent.  4.  Refill sent of Celexa at the 20 mg dose.  5.  Follow-up if symptoms do not improve or become worse.      Follow Up   Return if symptoms worsen or fail to improve, for Recheck.  Patient was given instructions and counseling regarding her condition or for health maintenance advice. Please see specific information pulled into the AVS if appropriate.

## 2023-01-31 ENCOUNTER — TELEPHONE (OUTPATIENT)
Dept: OTOLARYNGOLOGY | Facility: CLINIC | Age: 48
End: 2023-01-31

## 2023-01-31 NOTE — TELEPHONE ENCOUNTER
Provider: SEGUN ADAME  Caller: Aysha Barros  Relationship to Patient: SELF     Phone Number: [phone]  Reason for Call: PT IS UNABLE TO MAKE THEIR APPT TODAY DUE TO WEATHER, APPT HAS BEEN RESCHEDULED TO 2-16-23 @9AM

## 2023-02-16 ENCOUNTER — OFFICE VISIT (OUTPATIENT)
Dept: OTOLARYNGOLOGY | Facility: CLINIC | Age: 48
End: 2023-02-16
Payer: COMMERCIAL

## 2023-02-16 VITALS — HEIGHT: 65 IN | TEMPERATURE: 97.2 F | BODY MASS INDEX: 28.99 KG/M2 | WEIGHT: 174 LBS

## 2023-02-16 DIAGNOSIS — R42 VERTIGO: ICD-10-CM

## 2023-02-16 DIAGNOSIS — H90.5 UNILATERAL SENSORINEURAL HEARING LOSS: ICD-10-CM

## 2023-02-16 DIAGNOSIS — H81.01 MENIERE DISEASE, RIGHT: ICD-10-CM

## 2023-02-16 DIAGNOSIS — H90.3 ASYMMETRIC SNHL (SENSORINEURAL HEARING LOSS): Primary | ICD-10-CM

## 2023-02-16 DIAGNOSIS — H93.8X1 EAR FULLNESS, RIGHT: ICD-10-CM

## 2023-02-16 DIAGNOSIS — H93.11 TINNITUS OF RIGHT EAR: ICD-10-CM

## 2023-02-16 PROCEDURE — 99214 OFFICE O/P EST MOD 30 MIN: CPT | Performed by: NURSE PRACTITIONER

## 2023-02-16 RX ORDER — GLYCOPYRROLATE 1 MG/1
1 TABLET ORAL 3 TIMES DAILY PRN
Qty: 60 TABLET | Refills: 3 | Status: SHIPPED | OUTPATIENT
Start: 2023-02-16 | End: 2023-03-18

## 2023-02-28 ENCOUNTER — TELEPHONE (OUTPATIENT)
Dept: OTOLARYNGOLOGY | Facility: CLINIC | Age: 48
End: 2023-02-28
Payer: COMMERCIAL

## 2023-02-28 NOTE — TELEPHONE ENCOUNTER
Patient is calling about the name and phone number of the specialist she is being referred to in [place] so she can keep tabs on her referral to them.    I don't see an outgoing ambulatory referral. I only see MRI referral orders from 2/16/2023. The note from her last visit is not completed. What specialty and provider are you thinking?

## 2023-03-09 ENCOUNTER — TELEPHONE (OUTPATIENT)
Dept: OTOLARYNGOLOGY | Facility: CLINIC | Age: 48
End: 2023-03-09
Payer: COMMERCIAL

## 2023-03-09 ENCOUNTER — HOSPITAL ENCOUNTER (OUTPATIENT)
Dept: MRI IMAGING | Facility: HOSPITAL | Age: 48
Discharge: HOME | End: 2023-03-09
Payer: COMMERCIAL

## 2023-03-09 DIAGNOSIS — H90.3 ASYMMETRIC SNHL (SENSORINEURAL HEARING LOSS): ICD-10-CM

## 2023-03-09 DIAGNOSIS — C71.9 LOW GRADE GLIOMA OF BRAIN: Primary | ICD-10-CM

## 2023-03-09 PROCEDURE — 70553 MRI BRAIN STEM W/O & W/DYE: CPT

## 2023-03-09 PROCEDURE — 0 GADOBENATE DIMEGLUMINE 529 MG/ML SOLUTION: Performed by: NURSE PRACTITIONER

## 2023-03-09 PROCEDURE — A9577 INJ MULTIHANCE: HCPCS | Performed by: NURSE PRACTITIONER

## 2023-03-09 RX ADMIN — GADOBENATE DIMEGLUMINE 15 ML: 529 INJECTION, SOLUTION INTRAVENOUS at 09:08

## 2023-03-09 NOTE — TELEPHONE ENCOUNTER
Spoke with patient regarding MRI results. The abnormality seen on MRI has previously been evaluated by Dr. Olsen. We will refer her back to him for follow-up evaluation.

## 2023-03-27 ENCOUNTER — TELEPHONE (OUTPATIENT)
Dept: NEUROSURGERY | Age: 48
End: 2023-03-27

## 2023-03-27 ENCOUNTER — TELEPHONE (OUTPATIENT)
Dept: OTOLARYNGOLOGY | Facility: CLINIC | Age: 48
End: 2023-03-27
Payer: COMMERCIAL

## 2023-03-27 NOTE — TELEPHONE ENCOUNTER
1st attempt to contact patient.  I left a voicemail instructing patient to call back at [phone] to schedule their new patient appointment     Malignant neoplasm of brain, unspecified    [facility] 3/9/23

## 2023-03-27 NOTE — TELEPHONE ENCOUNTER
This nurse sent referral to Dr. Olsen's office and they stated that they have tried to call the pt to make her an appointment so they are just waiting on the pt to return call.

## 2023-03-29 NOTE — TELEPHONE ENCOUNTER
[facility] Neurosurgery New Patient Questionnaire    Diagnosis/Reason for Referral?    Malignant neoplasm of brain, unspecified    2. Who is completing questionnaire? Patient  Caregiver Family      3. Has the patient had any previous spinal/brain surgeries? NO      A. If yes, what is the name of the facility in which the surgery was performed? B. Procedure/Surgery performed? C. Who was the surgeon? D. When was the surgery? MM/YY       E. Did the patient improve after the surgery? 4. Is this a second opinion? If yes, Dr. Ankur Portillo would like to review patient first before making the appointment. NO    5. Have MRI Images been obtain within the last year? Yes  No      XR  CT     If yes, where was the imaging performed? [facility]   If yes, what part of the body? Lumbar  Cervical  Thoracic  Brain     If yes, when was it obtained? TWO WEEKS AGO    Note: if the scan was performed at a facility other than [facility], the disc will need to be brought to the appointment or we need to reach out to obtain the disc. A. Was the patient instructed to provide the disc? Yes   No      8. Has the patient had a NCV/EMG within the last year? Yes  No     If yes, where was it performed and date? MM/YY  Location:      9. Has the patient been to Physical Therapy? Yes  No     If yes, what location, how long attended, and last visit? Location:        Therapy Lasted:    Date of Last Visit:      10. Has the patient been to Pain Management? Yes  No     If yes, what location and last visit     Location:   Last Visit:   Is it helping?

## 2023-03-30 ENCOUNTER — TELEPHONE (OUTPATIENT)
Dept: OTOLARYNGOLOGY | Facility: CLINIC | Age: 48
End: 2023-03-30
Payer: COMMERCIAL

## 2023-03-30 NOTE — TELEPHONE ENCOUNTER
This nurse called pt and pt's  regarding the referral to Dr. Olsen.  I sent the referral last week and called the 27th to check on the status of the referral and was told that they have tried to call the pt to make an appointment and had to leave a message due to no one answering the phone.  This nurse called and LM regarding the doctor trying to get ahold of them.  Awaiting a call back.

## 2023-04-18 ENCOUNTER — OFFICE VISIT (OUTPATIENT)
Dept: NEUROSURGERY | Age: 48
End: 2023-04-18
Payer: COMMERCIAL

## 2023-04-18 VITALS
HEIGHT: 65 IN | DIASTOLIC BLOOD PRESSURE: 90 MMHG | WEIGHT: 178 LBS | BODY MASS INDEX: 29.66 KG/M2 | SYSTOLIC BLOOD PRESSURE: 150 MMHG | RESPIRATION RATE: 18 BRPM | HEART RATE: 77 BPM

## 2023-04-18 DIAGNOSIS — C71.9 LOW GRADE GLIOMA OF BRAIN (HCC): Primary | ICD-10-CM

## 2023-04-18 PROCEDURE — 99203 OFFICE O/P NEW LOW 30 MIN: CPT | Performed by: NURSE PRACTITIONER

## 2023-04-18 ASSESSMENT — ENCOUNTER SYMPTOMS
RESPIRATORY NEGATIVE: 1
GASTROINTESTINAL NEGATIVE: 1
EYES NEGATIVE: 1

## 2023-04-18 NOTE — PROGRESS NOTES
Review of Systems   Neurological:  Positive for dizziness and headaches. All other systems reviewed and are negative.
pattern      DATA and IMAGING:    Nursing/pcp notes, imaging, labs, and vitals reviewed. PT,OT and/or speech notes reviewed    Lab Results   Component Value Date    WBC 9.1 04/29/2018    HGB 14.9 04/29/2018    HCT 45.6 04/29/2018    MCV 92.7 04/29/2018     04/29/2018     Lab Results   Component Value Date     (L) 04/29/2018    K 4.3 04/29/2018    CL 97 (L) 04/29/2018    CO2 23 04/29/2018    BUN 15 04/29/2018    CREATININE 0.6 04/29/2018    GLUCOSE 113 (H) 04/29/2018    CALCIUM 9.3 04/29/2018    PROT 7.0 04/29/2018    LABALBU 4.5 04/29/2018    BILITOT 0.4 04/29/2018    ALKPHOS 30 (L) 04/29/2018    AST 14 04/29/2018    ALT 12 04/29/2018    LABGLOM >60 04/29/2018   No results found for: INR, PROTIME      MRI Brain (3/09/2023) [employer] witness  I have personally reviewed these images and my interpretation is: The LEFT frontal lesion that was concerning for a low grade glioma is completely unchanged when compared to the MRI dated back 7/12/2018        ASSESSMENT:    This is a 41 y.o. female who presents with an abnormal MRI brain that is stable. ICD-10-CM    1. 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 Miguel A Avery at length. We explained that the new MRI brain is essentially unchanged since the previous MRI dated back in 2018. If this is a tumor it is very slow growing and benign.   She certainly does not need any surgical intervention.    -Follow up as needed for now        PATRICK Neal

## 2023-08-18 ENCOUNTER — HOSPITAL ENCOUNTER (OUTPATIENT)
Dept: GENERAL RADIOLOGY | Facility: HOSPITAL | Age: 48
Discharge: HOME | End: 2023-08-18
Admitting: PODIATRIST
Payer: COMMERCIAL

## 2023-08-18 DIAGNOSIS — M79.671 FOOT PAIN, RIGHT: ICD-10-CM

## 2023-08-18 PROCEDURE — 73630 X-RAY EXAM OF FOOT: CPT

## 2023-08-24 ENCOUNTER — TELEPHONE (OUTPATIENT)
Dept: PODIATRY | Facility: CLINIC | Age: 48
End: 2023-08-24
Payer: COMMERCIAL

## 2023-08-25 ENCOUNTER — TELEPHONE (OUTPATIENT)
Dept: PODIATRY | Facility: CLINIC | Age: 48
End: 2023-08-25
Payer: COMMERCIAL

## 2023-08-30 NOTE — PROGRESS NOTES
[facility] - PODIATRY    Today's Date: 09/08/2023     Patient Name: Aysha Barros  MRN: [id number]  CSN: [id number]  PCP: Laron De Jesus MD  Referring Provider: No ref. provider found    SUBJECTIVE     Chief Complaint   Patient presents with    Follow-up     Babs Elizabet Ramirez, APRN-07/07/2023 6 WK FU RECHECK- pt states she is here today for a check up on rt foot pain no changes since last office visit/had xray 08/18-pt reports pain level 6/10 while active      HPI: Aysha Barros, a 47 y.o.female, comes to clinic as a(n) established patient complaining of mass of right foot . Patient has h/o anxiety, depression .  States that off and on for a few years she has noticed a nodule on the dorsal surface of her right foot. The mass is sometimes larger than it is today. Is more uncomfortable when wearing tighter shoes. States it started after a period of running.  Has had xrays since last visit.  Admits pain at 6/10 level and described as shooting, aching, and tingling. Relates previous treatment(s) including padding . Denies any constitutional symptoms. No other pedal complaints at this time.    Past Medical History:   Diagnosis Date    Anxiety     Depression      History reviewed. No pertinent surgical history.  History reviewed. No pertinent family history.  Social History     Socioeconomic History    Marital status:    Tobacco Use    Smoking status: Never     Passive exposure: Never    Smokeless tobacco: Never   Vaping Use    Vaping Use: Never used   Substance and Sexual Activity    Alcohol use: Not Currently    Drug use: Never    Sexual activity: Never     Allergies   Allergen Reactions    Cefaclor Hives     Current Outpatient Medications   Medication Sig Dispense Refill    busPIRone (BUSPAR) 10 MG tablet Take 1 tablet by mouth 3 (Three) Times a Day As Needed (anxiety). 90 tablet 2    citalopram (CeleXA) 20 MG tablet Take 1 tablet by mouth Daily. 30 tablet 2    fluticasone (Flonase) 50 MCG/ACT nasal  spray 2 sprays into the nostril(s) as directed by provider Daily. 15.8 mL 2    glycopyrrolate (ROBINUL) 1 MG tablet Take 1 tablet by mouth.      meclizine (ANTIVERT) 12.5 MG tablet Take 1 tablet by mouth 3 (Three) Times a Day As Needed for Dizziness. 90 tablet 0    montelukast (Singulair) 10 MG tablet Take 1 tablet by mouth Every Night. 30 tablet 4    rizatriptan (Maxalt) 10 MG tablet Take 1 tablet by mouth 1 (One) Time As Needed for Migraine. May repeat in 2 hours if needed 9 tablet 2    topiramate (Topamax) 50 MG tablet Take 1 tab po Qhs x 1 week then take 1 tab po BID 60 tablet 2     No current facility-administered medications for this visit.     Review of Systems   Constitutional:  Negative for chills and fever.   HENT:  Negative for congestion.    Respiratory:  Negative for shortness of breath.    Cardiovascular:  Negative for chest pain and leg swelling.   Gastrointestinal:  Negative for constipation, diarrhea, nausea and vomiting.   Musculoskeletal:         Foot pain   Skin:  Negative for wound.   Neurological:  Negative for numbness.     OBJECTIVE     Vitals:    09/08/23 1014   BP: 122/80   Pulse: 90   SpO2: 98%       PHYSICAL EXAM  GEN:   Accompanied by none.     Foot/Ankle Exam    GENERAL  Appearance:  appears stated age  Orientation:  AAOx3  Affect:  appropriate  Gait:  unimpaired  Assistance:  independent  Right shoe gear: sandal  Left shoe gear: sandal    VASCULAR     Right Foot Vascularity   Dorsalis pedis:  2+  Posterior tibial:  2+  Skin temperature:  warm  Edema grading:  None  CFT:  3  Pedal hair growth:  Present  Varicosities:  none     Left Foot Vascularity   Dorsalis pedis:  2+  Posterior tibial:  2+  Skin temperature:  warm  Edema grading:  None  CFT:  3  Pedal hair growth:  Present  Varicosities:  none     NEUROLOGIC     Right Foot Neurologic   Normal sensation    Light touch sensation: normal  Vibratory sensation: normal (mild tingling along DPN)  Hot/Cold sensation: normal  Protective  Sensation using Nucla-Ceci Monofilament:   Sites intact: 10  Sites tested: 10     Left Foot Neurologic   Normal sensation    Light touch sensation: normal  Vibratory sensation: normal  Hot/Cold sensation:  normal  Protective Sensation using Nucla-Ceci Monofilament:   Sites intact: 10  Sites tested: 10    MUSCULOSKELETAL     Right Foot Musculoskeletal   Tenderness:  dorsal foot    Arch:  Normal     Left Foot Musculoskeletal   Tenderness:  none  Arch:  Normal    MUSCLE STRENGTH     Right Foot Muscle Strength   Foot dorsiflexion:  5  Foot plantar flexion:  5  Foot inversion:  5  Foot eversion:  5     Left Foot Muscle Strength   Foot dorsiflexion:  5  Foot plantar flexion:  5  Foot inversion:  5  Foot eversion:  5    RANGE OF MOTION     Right Foot Range of Motion   Foot and ankle ROM within normal limits       Left Foot Range of Motion   Foot and ankle ROM within normal limits      DERMATOLOGIC      Right Foot Dermatologic   Skin  Right foot skin is intact.      Left Foot Dermatologic   Skin  Left foot skin is intact.     Image:     RADIOLOGY/NUCLEAR:  XR Foot 3+ View Right    Result Date: 8/18/2023  Narrative: EXAMINATION:  XR FOOT 3+ VW RIGHT-  8/18/2023 1:27 PM CDT  HISTORY: Dorsal right foot pain. M79.671-Pain in right foot.  COMPARISON: No comparison study.  TECHNIQUE: 3 weightbearing views were obtained.  FINDINGS:  There is bunion formation along the medial distal first metatarsal. Joint spaces appear preserved. No fracture is seen. There is a tiny plantar calcaneal spur. There is mild flat foot deformity on the lateral image.       Impression: 1. Bunion formation along the distal medial first metatarsal. 2. Mild flatfoot deformity. Tiny plantar calcaneal spur.  This report was finalized on 08/18/2023 14:52 by Dr. Prosper Messina MD.     LABORATORY/CULTURE RESULTS:      PATHOLOGY RESULTS:       ASSESSMENT/PLAN     Diagnoses and all orders for this visit:    1. Ganglion cyst of right foot  (Primary)    2. Foot pain, right        Comprehensive lower extremity examination and evaluation was performed.  Discussed findings and treatment plan including risks, benefits, and treatment options with patient in detail. Patient agreed with treatment plan.  Clinical findings consistent with ganglion cyst vs midfoot arthritis.     Reviewed xrays with patient. Mild midfoot arthritic changes.   Discussed conservative treatment with padding, shoe mods, topical analgesics vs surgery.   Patient wishes to continue conservative therapy for now but may consider surgery if mass worsens.   An After Visit Summary was printed and given to the patient at discharge, including (if requested) any available informative/educational handouts regarding diagnosis, treatment, or medications. All questions were answered to patient/family satisfaction. Should symptoms fail to improve or worsen they agree to call or return to clinic or to go to the Emergency Department. Discussed the importance of following up with any needed screening tests/labs/specialist appointments and any requested follow-up recommended by me today. Importance of maintaining follow-up discussed and patient accepts that missed appointments can delay diagnosis and potentially lead to worsening of conditions.  Return if symptoms worsen or fail to improve., or sooner if acute issues arise.    Lab Frequency Next Occurrence       This document has been electronically signed by Praneeth Mueller DPM on September 8, 2023 10:51 CDT

## 2023-09-07 ENCOUNTER — TELEPHONE (OUTPATIENT)
Dept: PODIATRY | Facility: CLINIC | Age: 48
End: 2023-09-07
Payer: COMMERCIAL

## 2023-09-08 ENCOUNTER — OFFICE VISIT (OUTPATIENT)
Dept: PODIATRY | Facility: CLINIC | Age: 48
End: 2023-09-08
Payer: COMMERCIAL

## 2023-09-08 VITALS
HEIGHT: 65 IN | WEIGHT: 167 LBS | DIASTOLIC BLOOD PRESSURE: 80 MMHG | OXYGEN SATURATION: 98 % | SYSTOLIC BLOOD PRESSURE: 122 MMHG | HEART RATE: 90 BPM | BODY MASS INDEX: 27.82 KG/M2

## 2023-09-08 DIAGNOSIS — M79.671 FOOT PAIN, RIGHT: ICD-10-CM

## 2023-09-08 DIAGNOSIS — M67.471 GANGLION CYST OF RIGHT FOOT: Primary | ICD-10-CM

## 2023-11-03 ENCOUNTER — OFFICE VISIT (OUTPATIENT)
Dept: FAMILY MEDICINE CLINIC | Facility: CLINIC | Age: 48
End: 2023-11-03
Payer: COMMERCIAL

## 2023-11-03 VITALS
DIASTOLIC BLOOD PRESSURE: 92 MMHG | SYSTOLIC BLOOD PRESSURE: 138 MMHG | BODY MASS INDEX: 28.66 KG/M2 | RESPIRATION RATE: 20 BRPM | HEART RATE: 102 BPM | HEIGHT: 65 IN | WEIGHT: 172 LBS | TEMPERATURE: 98 F | OXYGEN SATURATION: 96 %

## 2023-11-03 DIAGNOSIS — G43.809 OTHER MIGRAINE WITHOUT STATUS MIGRAINOSUS, NOT INTRACTABLE: ICD-10-CM

## 2023-11-03 DIAGNOSIS — T78.40XD ALLERGY, SUBSEQUENT ENCOUNTER: ICD-10-CM

## 2023-11-03 DIAGNOSIS — J04.0 LARYNGITIS: Primary | ICD-10-CM

## 2023-11-03 RX ORDER — DEXAMETHASONE SODIUM PHOSPHATE 10 MG/ML
8 INJECTION INTRAMUSCULAR; INTRAVENOUS ONCE
Status: COMPLETED | OUTPATIENT
Start: 2023-11-03 | End: 2023-11-03

## 2023-11-03 RX ORDER — TOPIRAMATE 50 MG/1
TABLET, FILM COATED ORAL
Qty: 60 TABLET | Refills: 2 | Status: SHIPPED | OUTPATIENT
Start: 2023-11-03

## 2023-11-03 RX ORDER — MONTELUKAST SODIUM 10 MG/1
10 TABLET ORAL NIGHTLY
Qty: 30 TABLET | Refills: 11 | Status: SHIPPED | OUTPATIENT
Start: 2023-11-03

## 2023-11-03 RX ADMIN — DEXAMETHASONE SODIUM PHOSPHATE 8 MG: 10 INJECTION INTRAMUSCULAR; INTRAVENOUS at 11:00

## 2023-11-03 NOTE — PROGRESS NOTES
"Chief Complaint  Nasal Congestion, Cough, and Hoarse    Subjective    History of Present Illness      Patient presents to [facility] for   History of Present Illness  Pt is here today c/o of nasal congestion, cough, headache, and hoarseness x 3 days. Has been out of her Singulair and thinks this is the problem.  No temp detected or reported. She also has been having 4 migraines a month and wants to discuss her Topamax.        Review of Systems    I have reviewed and agree with the HPI and ROS information as above.  UMER Chowdhury     Objective   Vital Signs:   /92   Pulse 102   Temp 98 °F (36.7 °C)   Resp 20   Ht 165.1 cm (65\")   Wt 78 kg (172 lb)   SpO2 96%   BMI 28.62 kg/m²            Physical Exam  Constitutional:       Appearance: Normal appearance. She is well-developed.   HENT:      Head: Normocephalic and atraumatic.      Right Ear: Tympanic membrane, ear canal and external ear normal.      Left Ear: Tympanic membrane, ear canal and external ear normal.      Nose: Nose normal. No septal deviation, nasal tenderness or congestion.      Mouth/Throat:      Lips: Pink. No lesions.      Mouth: Mucous membranes are moist. No oral lesions.      Dentition: Normal dentition.      Pharynx: No pharyngeal swelling, oropharyngeal exudate or posterior oropharyngeal erythema.      Comments: Hoarse   Eyes:      General: Lids are normal. Vision grossly intact. No scleral icterus.        Right eye: No discharge.         Left eye: No discharge.      Extraocular Movements: Extraocular movements intact.      Conjunctiva/sclera: Conjunctivae normal.      Right eye: Right conjunctiva is not injected.      Left eye: Left conjunctiva is not injected.      Pupils: Pupils are equal, round, and reactive to light.   Neck:      Thyroid: No thyroid mass.      Trachea: Trachea normal.   Cardiovascular:      Rate and Rhythm: Normal rate and regular rhythm.      Heart sounds: Normal heart " sounds. No murmur heard.     No gallop.   Pulmonary:      Effort: Pulmonary effort is normal.      Breath sounds: Normal breath sounds and air entry. No wheezing, rhonchi or rales.   Abdominal:      General: There is no distension.      Palpations: Abdomen is soft. There is no mass.      Tenderness: There is no abdominal tenderness. There is no right CVA tenderness, left CVA tenderness, guarding or rebound.   Musculoskeletal:         General: No tenderness or deformity. Normal range of motion.      Cervical back: Full passive range of motion without pain, normal range of motion and neck supple.      Thoracic back: Normal.      Right lower leg: No edema.      Left lower leg: No edema.   Skin:     General: Skin is warm and dry.      Coloration: Skin is not jaundiced.      Findings: No rash.   Neurological:      Mental Status: She is alert and oriented to person, place, and time.      Sensory: Sensation is intact.      Motor: Motor function is intact.      Coordination: Coordination is intact.      Gait: Gait is intact.      Deep Tendon Reflexes: Reflexes are normal and symmetric.   Psychiatric:         Mood and Affect: Mood and affect normal.         Judgment: Judgment normal.          PHQ-2 Depression Screening  Little interest or pleasure in doing things? 0-->not at all   Feeling down, depressed, or hopeless? 0-->not at all   PHQ-2 Total Score 0     PHQ-9 Depression Screening  Little interest or pleasure in doing things? 0-->not at all   Feeling down, depressed, or hopeless? 0-->not at all   Trouble falling or staying asleep, or sleeping too much?     Feeling tired or having little energy?     Poor appetite or overeating?     Feeling bad about yourself - or that you are a failure or have let yourself or your family down?     Trouble concentrating on things, such as reading the newspaper or watching television?     Moving or speaking so slowly that other people could have noticed? Or the opposite - being so fidgety  or restless that you have been moving around a lot more than usual?     Thoughts that you would be better off dead, or of hurting yourself in some way?     PHQ-9 Total Score 0   If you checked off any problems, how difficult have these problems made it for you to do your work, take care of things at home, or get along with other people?        Result Review  Data Reviewed:                   Assessment and Plan      Diagnoses and all orders for this visit:    1. Laryngitis (Primary)  Comments:  Steroid Im today.  Orders:  -     dexAMETHasone (DECADRON) injection 8 mg    2. Allergy, subsequent encounter  Comments:  Refill and cont singulair.  Orders:  -     montelukast (Singulair) 10 MG tablet; Take 1 tablet by mouth Every Night.  Dispense: 30 tablet; Refill: 11    3. Other migraine without status migrainosus, not intractable  Comments:  Increase topamax to bid dosing. If no improvement consider changing to nurtec. She does not want to be on injectible.  Orders:  -     topiramate (Topamax) 50 MG tablet; BID  Dispense: 60 tablet; Refill: 2            Follow Up   Return for Next scheduled follow up.  Patient was given instructions and counseling regarding her condition or for health maintenance advice. Please see specific information pulled into the AVS if appropriate.

## 2023-11-03 NOTE — PROGRESS NOTES
After obtaining consent, and per orders of UMER Nolasco, an injection of Decadron 8 mg was given by Isacc Cooney MA and administered to the left dorsogluteal IM. Patient instructed to remain in clinic for 20 minutes afterwards, and to report any adverse reaction to me immediately. Pt tolerated well with no adverse reactions.

## 2023-11-13 RX ORDER — CITALOPRAM HYDROBROMIDE 10 MG/1
10 TABLET ORAL DAILY
Qty: 30 TABLET | Refills: 2 | OUTPATIENT
Start: 2023-11-13

## 2023-11-14 ENCOUNTER — OFFICE VISIT (OUTPATIENT)
Dept: FAMILY MEDICINE CLINIC | Facility: CLINIC | Age: 48
End: 2023-11-14
Payer: COMMERCIAL

## 2023-11-14 VITALS
OXYGEN SATURATION: 98 % | WEIGHT: 174 LBS | HEART RATE: 88 BPM | DIASTOLIC BLOOD PRESSURE: 87 MMHG | HEIGHT: 65 IN | BODY MASS INDEX: 28.99 KG/M2 | SYSTOLIC BLOOD PRESSURE: 137 MMHG

## 2023-11-14 DIAGNOSIS — M67.449 GANGLION CYST OF JOINT OF FINGER: Primary | ICD-10-CM

## 2023-11-14 PROCEDURE — 99213 OFFICE O/P EST LOW 20 MIN: CPT | Performed by: NURSE PRACTITIONER

## 2023-11-14 NOTE — PROGRESS NOTES
"Chief Complaint  Cyst    Subjective    History of Present Illness      Patient presents to [facility] for   History of Present Illness  Pt is here today with c/o cyst on right middle finger on joint closest to tip of finger that appeared a couple weeks ago. Symptoms have not changed during this time, good or bad. Area is tender to touch. Some redness in area.        Review of Systems    I have reviewed and agree with the HPI and ROS information as above.  Elizabet Brice, UMER     Objective   Vital Signs:   /87   Pulse 88   Ht 165.1 cm (65\")   Wt 78.9 kg (174 lb)   SpO2 98%   BMI 28.96 kg/m²            Physical Exam  Constitutional:       Appearance: Normal appearance. She is well-developed.   HENT:      Head: Normocephalic and atraumatic.      Right Ear: Tympanic membrane, ear canal and external ear normal.      Left Ear: Tympanic membrane, ear canal and external ear normal.      Nose: Nose normal. No septal deviation, nasal tenderness or congestion.      Mouth/Throat:      Lips: Pink. No lesions.      Mouth: Mucous membranes are moist. No oral lesions.      Dentition: Normal dentition.      Pharynx: Oropharynx is clear. No pharyngeal swelling, oropharyngeal exudate or posterior oropharyngeal erythema.   Eyes:      General: Lids are normal. Vision grossly intact. No scleral icterus.        Right eye: No discharge.         Left eye: No discharge.      Extraocular Movements: Extraocular movements intact.      Conjunctiva/sclera: Conjunctivae normal.      Right eye: Right conjunctiva is not injected.      Left eye: Left conjunctiva is not injected.      Pupils: Pupils are equal, round, and reactive to light.   Neck:      Thyroid: No thyroid mass.      Trachea: Trachea normal.   Cardiovascular:      Rate and Rhythm: Normal rate and regular rhythm.      Heart sounds: Normal heart sounds. No murmur heard.     No gallop.   Pulmonary:      Effort: Pulmonary effort is normal.      " Breath sounds: Normal breath sounds and air entry. No wheezing, rhonchi or rales.   Abdominal:      General: There is no distension.      Palpations: Abdomen is soft. There is no mass.      Tenderness: There is no abdominal tenderness. There is no right CVA tenderness, left CVA tenderness, guarding or rebound.   Musculoskeletal:         General: No tenderness or deformity. Normal range of motion.      Right hand: Swelling and tenderness present.      Cervical back: Full passive range of motion without pain, normal range of motion and neck supple.      Thoracic back: Normal.      Right lower leg: No edema.      Left lower leg: No edema.      Comments: Proximal DIP cyst, firm tender to right hand middle finger ; picture uploaded    Skin:     General: Skin is warm and dry.      Coloration: Skin is not jaundiced.      Findings: No rash.   Neurological:      Mental Status: She is alert and oriented to person, place, and time.      Sensory: Sensation is intact.      Motor: Motor function is intact.      Coordination: Coordination is intact.      Gait: Gait is intact.      Deep Tendon Reflexes: Reflexes are normal and symmetric.   Psychiatric:         Mood and Affect: Mood and affect normal.         Judgment: Judgment normal.             Result Review  Data Reviewed:                   Assessment and Plan      Diagnoses and all orders for this visit:    1. Ganglion cyst of joint of finger (Primary)  -     Ambulatory Referral to Orthopedic Surgery    Plan:   Consult ortho surgery-Dr Kevin. She wants this removed or reduced.   Pressure dressing applied with Coban to see if it reduces in size.   I spent 20 minutes caring for Aysha on this date of service. This time includes time spent by me in the following activities:performing a medically appropriate examination and/or evaluation , counseling and educating the patient/family/caregiver, referring and communicating with other health care professionals , and documenting  information in the medical record    Follow Up   Return if symptoms worsen or fail to improve.  Patient was given instructions and counseling regarding her condition or for health maintenance advice. Please see specific information pulled into the AVS if appropriate.

## 2023-12-15 ENCOUNTER — PRE-ADMISSION TESTING (OUTPATIENT)
Dept: PREADMISSION TESTING | Facility: HOSPITAL | Age: 48
End: 2023-12-15
Payer: COMMERCIAL

## 2023-12-15 VITALS
WEIGHT: 164.02 LBS | HEART RATE: 86 BPM | OXYGEN SATURATION: 98 % | HEIGHT: 65 IN | BODY MASS INDEX: 27.33 KG/M2 | DIASTOLIC BLOOD PRESSURE: 88 MMHG | SYSTOLIC BLOOD PRESSURE: 138 MMHG | RESPIRATION RATE: 20 BRPM

## 2023-12-15 LAB
ANION GAP SERPL CALCULATED.3IONS-SCNC: 8 MMOL/L (ref 5–15)
BUN SERPL-MCNC: 14 MG/DL (ref 6–20)
BUN/CREAT SERPL: 18.9 (ref 7–25)
CALCIUM SPEC-SCNC: 8.9 MG/DL (ref 8.6–10.5)
CHLORIDE SERPL-SCNC: 106 MMOL/L (ref 98–107)
CO2 SERPL-SCNC: 23 MMOL/L (ref 22–29)
CREAT SERPL-MCNC: 0.74 MG/DL (ref 0.57–1)
DEPRECATED RDW RBC AUTO: 45.8 FL (ref 37–54)
EGFRCR SERPLBLD CKD-EPI 2021: 101.2 ML/MIN/1.73
ERYTHROCYTE [DISTWIDTH] IN BLOOD BY AUTOMATED COUNT: 12.9 % (ref 12.3–15.4)
GLUCOSE SERPL-MCNC: 102 MG/DL (ref 65–99)
HCT VFR BLD AUTO: 42.5 % (ref 34–46.6)
HGB BLD-MCNC: 13.4 G/DL (ref 12–15.9)
MCH RBC QN AUTO: 30.2 PG (ref 26.6–33)
MCHC RBC AUTO-ENTMCNC: 31.5 G/DL (ref 31.5–35.7)
MCV RBC AUTO: 95.9 FL (ref 79–97)
PLATELET # BLD AUTO: 297 10*3/MM3 (ref 140–450)
PMV BLD AUTO: 10.9 FL (ref 6–12)
POTASSIUM SERPL-SCNC: 3.5 MMOL/L (ref 3.5–5.2)
RBC # BLD AUTO: 4.43 10*6/MM3 (ref 3.77–5.28)
SODIUM SERPL-SCNC: 137 MMOL/L (ref 136–145)
WBC NRBC COR # BLD AUTO: 6.94 10*3/MM3 (ref 3.4–10.8)

## 2023-12-15 PROCEDURE — 85027 COMPLETE CBC AUTOMATED: CPT

## 2023-12-15 PROCEDURE — 80048 BASIC METABOLIC PNL TOTAL CA: CPT

## 2023-12-15 PROCEDURE — 36415 COLL VENOUS BLD VENIPUNCTURE: CPT

## 2023-12-15 NOTE — DISCHARGE INSTRUCTIONS
How to Use Chlorhexidine Before Surgery  Chlorhexidine gluconate (CHG) is a germ-killing (antiseptic) solution that is used to clean the skin. It can get rid of the bacteria that normally live on the skin and can keep them away for about 24 hours. To clean your skin with CHG, you may be given:  A CHG solution to use in the shower or as part of a sponge bath.  A prepackaged cloth that contains CHG.  Cleaning your skin with CHG may help lower the risk for infection:  While you are staying in the intensive care unit of the hospital.  If you have a vascular access, such as a central line, to provide short-term or long-term access to your veins.  If you have a catheter to drain urine from your bladder.  If you are on a ventilator. A ventilator is a machine that helps you breathe by moving air in and out of your lungs.  After surgery.  What are the risks?  Risks of using CHG include:  A skin reaction.  Hearing loss, if CHG gets in your ears and you have a perforated eardrum.  Eye injury, if CHG gets in your eyes and is not rinsed out.  The CHG product catching fire.  Make sure that you avoid smoking and flames after applying CHG to your skin.  Do not use CHG:  If you have a chlorhexidine allergy or have previously reacted to chlorhexidine.  On babies younger than 2 months of age.  How to use CHG solution  Use CHG only as told by your health care provider, and follow the instructions on the label.  Use the full amount of CHG as directed. Usually, this is one bottle.  During a shower    Follow these steps when using CHG solution during a shower (unless your health care provider gives you different instructions):  Start the shower.  Use your normal soap and shampoo to wash your face and hair.  Turn off the shower or move out of the shower stream.  Pour the CHG onto a clean washcloth. Do not use any type of brush or rough-edged sponge.  Starting at your neck, lather your body down to your toes. Make sure you follow these  instructions:  If you will be having surgery, pay special attention to the part of your body where you will be having surgery. Scrub this area for at least 1 minute.  Do not use CHG on your head or face. If the solution gets into your ears or eyes, rinse them well with water.  Avoid your genital area.  Avoid any areas of skin that have broken skin, cuts, or scrapes.  Scrub your back and under your arms. Make sure to wash skin folds.  Let the lather sit on your skin for 1-2 minutes or as long as told by your health care provider.  Thoroughly rinse your entire body in the shower. Make sure that all body creases and crevices are rinsed well.  Dry off with a clean towel. Do not put any substances on your body afterward--such as powder, lotion, or perfume--unless you are told to do so by your health care provider. Only use lotions that are recommended by the .  Put on clean clothes or pajamas.  If it is the night before your surgery, sleep in clean sheets.     During a sponge bath  Follow these steps when using CHG solution during a sponge bath (unless your health care provider gives you different instructions):  Use your normal soap and shampoo to wash your face and hair.  Pour the CHG onto a clean washcloth.  Starting at your neck, lather your body down to your toes. Make sure you follow these instructions:  If you will be having surgery, pay special attention to the part of your body where you will be having surgery. Scrub this area for at least 1 minute.  Do not use CHG on your head or face. If the solution gets into your ears or eyes, rinse them well with water.  Avoid your genital area.  Avoid any areas of skin that have broken skin, cuts, or scrapes.  Scrub your back and under your arms. Make sure to wash skin folds.  Let the lather sit on your skin for 1-2 minutes or as long as told by your health care provider.  Using a different clean, wet washcloth, thoroughly rinse your entire body. Make sure that  all body creases and crevices are rinsed well.  Dry off with a clean towel. Do not put any substances on your body afterward--such as powder, lotion, or perfume--unless you are told to do so by your health care provider. Only use lotions that are recommended by the .  Put on clean clothes or pajamas.  If it is the night before your surgery, sleep in clean sheets.  How to use CHG prepackaged cloths  Only use CHG cloths as told by your health care provider, and follow the instructions on the label.  Use the CHG cloth on clean, dry skin.  Do not use the CHG cloth on your head or face unless your health care provider tells you to.  When washing with the CHG cloth:  Avoid your genital area.  Avoid any areas of skin that have broken skin, cuts, or scrapes.  Before surgery    Follow these steps when using a CHG cloth to clean before surgery (unless your health care provider gives you different instructions):  Using the CHG cloth, vigorously scrub the part of your body where you will be having surgery. Scrub using a back-and-forth motion for 3 minutes. The area on your body should be completely wet with CHG when you are done scrubbing.  Do not rinse. Discard the cloth and let the area air-dry. Do not put any substances on the area afterward, such as powder, lotion, or perfume.  Put on clean clothes or pajamas.  If it is the night before your surgery, sleep in clean sheets.     For general bathing  Follow these steps when using CHG cloths for general bathing (unless your health care provider gives you different instructions).  Use a separate CHG cloth for each area of your body. Make sure you wash between any folds of skin and between your fingers and toes. Wash your body in the following order, switching to a new cloth after each step:  The front of your neck, shoulders, and chest.  Both of your arms, under your arms, and your hands.  Your stomach and groin area, avoiding the genitals.  Your right leg and  foot.  Your left leg and foot.  The back of your neck, your back, and your buttocks.  Do not rinse. Discard the cloth and let the area air-dry. Do not put any substances on your body afterward--such as powder, lotion, or perfume--unless you are told to do so by your health care provider. Only use lotions that are recommended by the .  Put on clean clothes or pajamas.  Contact a health care provider if:  Your skin gets irritated after scrubbing.  You have questions about using your solution or cloth.  You swallow any chlorhexidine. Call your local poison control center (1-692.290.5275 in the [place]  Get help right away if:  Your eyes itch badly, or they become very red or swollen.  Your skin itches badly and is red or swollen.  Your hearing changes.  You have trouble seeing.  You have swelling or tingling in your mouth or throat.  You have trouble breathing.  These symptoms may represent a serious problem that is an emergency. Do not wait to see if the symptoms will go away. Get medical help right away. Call your local emergency services (509 in the [place] Do not drive yourself to the hospital.  Summary  Chlorhexidine gluconate (CHG) is a germ-killing (antiseptic) solution that is used to clean the skin. Cleaning your skin with CHG may help to lower your risk for infection.  You may be given CHG to use for bathing. It may be in a bottle or in a prepackaged cloth to use on your skin. Carefully follow your health care provider's instructions and the instructions on the product label.  Do not use CHG if you have a chlorhexidine allergy.  Contact your health care provider if your skin gets irritated after scrubbing.  This information is not intended to replace advice given to you by your health care provider. Make sure you discuss any questions you have with your health care provider.  Document Revised: 04/17/2023 Document Reviewed: 02/28/2022  Elsevier Patient Education © 2023 [employer]      Before you  come to the hospital        Arrival time: AS DIRECTED BY OFFICE     YOU MAY TAKE THE FOLLOWING MEDICATION(S) THE MORNING OF SURGERY WITH A SIP OF WATER: ***           ALL OTHER HOME MEDICATION CHECK WITH YOUR PHYSICIAN (especially if   you are taking diabetes medicines or blood thinners)    Do not take any Erectile Dysfunction medications (EX: CIALIS, VIAGRA) 24 hours prior to surgery.      If you were given and instructed to use a germ- killing soap, use as directed the night before surgery and again the morning of surgery or as directed by your surgeon. (Use one-half of the bottle with each shower.)   See attached information for How to Use Chlorhexidine for Bathing if applicable.            Eating and drinking restrictions prior to scheduled arrival time    2 Hours before arrival time STOP   Drinking Clear liquids (water, black coffee-NO CREAM,  apple juice-no pulp)    Clear Liquids    Water and flavored water                                                                      Clear Fruit juices, such as cranberry juice and apple juice.  Black coffee (NO cream of any kind, including powdered).  Plain tea  Clear bouillon or broth.  Flavored gelatin.  Soda.  Gatorade or Powerade.    8 Hours before arrival time STOP   All food, full liquids, and dairy products  Full liquid examples  Juices that have pulp.  Frozen ice pops that contain fruit pieces.  Coffee with creamer  Milk.  Yogurt.    (It is extremely important that you follow these guidelines to prevent delay or cancelation of your procedure)                       MANAGING PAIN AFTER SURGERY    We know you are probably wondering what your pain will be like after surgery.  Following surgery it is unrealistic to expect you will not have pain.   Pain is how our bodies let us know that something is wrong or cautions us to be careful.  That said, our goal is to make your pain tolerable.    Methods we may use to treat your pain include (oral or IV medications, PCAs,  epidurals, nerve blocks, etc.)   While some procedures require IV pain medications for a short time after surgery, transitioning to pain medications by mouth allows for better management of pain.   Your nurse will encourage you to take oral pain medications whenever possible.  IV medications work almost immediately, but only last a short while.  Taking medications by mouth allows for a more constant level of medication in your blood stream for a longer period of time.      Once your pain is out of control it is harder to get back under control.  It is important you are aware when your next dose of pain medication is due.  If you are admitted, your nurse may write the time of your next dose on the white board in your room to help you remember.      We are interested in your pain and encourage you to inform us about aggravating factors during your visit.   Many times a simple repositioning every few hours can make a big difference.    If your physician says it is okay, do not let your pain prevent you from getting out of bed. Be sure to call your nurse for assistance prior to getting up so you do not fall.      Before surgery, please decide your tolerable pain goal.  These faces help describe the pain ratings we use on a 0-10 scale.   Be prepared to tell us your goal and whether or not you take pain or anxiety medications at home.          Preparing for Surgery  Preparing for surgery is an important part of your care. It can make things go more smoothly and help you avoid complications. The steps leading up to surgery may vary among hospitals. Follow all instructions given to you by your health care providers. Ask questions if you do not understand something. Talk about any concerns that you have.  Here are some questions to consider asking before your surgery:  If my surgery is not an emergency (is elective), when would be the best time to have the surgery?  What arrangements do I need to make for work, home, or  school?  What will my recovery be like? How long will it be before I can return to normal activities?  Will I need to prepare my home? Will I need to arrange care for me or my children?  Should I expect to have pain after surgery? What are my pain management options? Are there nonmedical options that I can try for pain?  Tell a health care provider about:  Any allergies you have.  All medicines you are taking, including vitamins, herbs, eye drops, creams, and over-the-counter medicines.  Any problems you or family members have had with anesthetic medicines.  Any blood disorders you have.  Any surgeries you have had.  Any medical conditions you have.  Whether you are pregnant or may be pregnant.  What are the risks?  The risks and complications of surgery depend on the specific procedure that you have. Discuss all the risks with your health care providers before your surgery. Ask about common surgical complications, which may include:  Infection.  Bleeding or a need for blood replacement (transfusion).  Allergic reactions to medicines.  Damage to surrounding nerves, tissues, or structures.  A blood clot.  Scarring.  Failure of the surgery to correct the problem.  Follow these instructions before the procedure:  Several days or weeks before your procedure  You may have a physical exam by your primary health care provider to make sure it is safe for you to have surgery.  You may have testing. This may include a chest X-ray, blood and urine tests, electrocardiogram (ECG), or other testing.  Ask your health care provider about:  Changing or stopping your regular medicines. This is especially important if you are taking diabetes medicines or blood thinners.  Taking medicines such as aspirin and ibuprofen. These medicines can thin your blood. Do not take these medicines unless your health care provider tells you to take them.  Taking over-the-counter medicines, vitamins, herbs, and supplements.  Do not use any products  that contain nicotine or tobacco, such as cigarettes and e-cigarettes. If you need help quitting, ask your health care provider.  Avoid alcohol.  Ask your health care provider if there are exercises you can do to prepare for surgery.  Eat a healthy diet.   Plan to have someone 18 years of age or older to take you home from the hospital. We will need to verify your ride on the morning of surgery if you are being discharged home on the same day. Tell your ride to be expecting a call from the hospital prior to your procedure.   Plan to have a responsible adult care for you for at least 24 hours after you leave the hospital or clinic. This is important.  The day before your procedure  You may be given antibiotic medicine to take by mouth to help prevent infection. Take it as told by your health care provider.  You may be asked to shower with a germ-killing soap.  Follow instructions from your health care provider about eating and drinking restrictions. This includes gum, mints and hard candy.  Pack comfortable clothes according to your procedure.   The day of your procedure  You may need to take another shower with a germ-killing soap before you leave home in the morning.  With a small sip of water, take only the medicines that you are told to take.  Remove all jewelry including rings.   Leave anything you consider valuable at home except hearing aids if needed.  You do not need to bring your home medications into the hospital.   Do not wear any makeup, nail polish, powder, deodorant, lotion, hair accessories, or anything on your skin or body except your clothes.  If you will be staying in the hospital, bring a case to hold your glasses, contacts, or dentures. You may also want to bring your robe and non-skid footwear.       (Do not use denture adhesives since you will be asked to remove them during  surgery).   If you wear oxygen at home, bring it with you the day of surgery.  If instructed by your health care  provider, bring your sleep apnea device with you on the day of your surgery (if this applies to you).  You may want to leave your suitcase and sleep apnea device in the car until after surgery.   Arrive at the hospital as scheduled.  Bring a friend or family member with you who can help to answer questions and be present while you meet with your health care provider.  At the hospital  When you arrive at the hospital:  Go to registration located at the main entrance of the hospital. You will be registered and given a beeper and a sticker sheet. Take the stickers to the Outpatient nurses desk and place in the black tray. This is to notify staff that you have arrived. Then return to the lobby to wait.   When your beeper lights up and vibrates proceed through the double doors, under the stairs, and a member of the Outpatient Surgery staff will escort you to your preoperative room.  You may have to wear compression sleeves. These help to prevent blood clots and reduce swelling in your legs.  An IV may be inserted into one of your veins.              In the operating room, you may be given one or more of the following:        A medicine to help you relax (sedative).        A medicine to numb the area (local anesthetic).        A medicine to make you fall asleep (general anesthetic).        A medicine that is injected into an area of your body to numb everything below the                      injection site (regional anesthetic).  You may be given an antibiotic through your IV to help prevent infection.  Your surgical site will be marked or identified.    Contact a health care provider if you:  Develop a fever of more than 100.4°F (38°C) or other feelings of illness during the 48 hours before your surgery.  Have symptoms that get worse.  Have questions or concerns about your surgery.  Summary  Preparing for surgery can make the procedure go more smoothly and lower your risk of complications.  Before surgery, make a list of  questions and concerns to discuss with your surgeon. Ask about the risks and possible complications.  In the days or weeks before your surgery, follow all instructions from your health care provider. You may need to stop smoking, avoid alcohol, follow eating restrictions, and change or stop your regular medicines.  Contact your surgeon if you develop a fever or other signs of illness during the few days before your surgery.  This information is not intended to replace advice given to you by your health care provider. Make sure you discuss any questions you have with your health care provider.  Document Revised: 12/21/2018 Document Reviewed: 10/23/2018  Elsevier Patient Education © 2021 [employer] Inc.

## 2023-12-27 ENCOUNTER — ANESTHESIA (OUTPATIENT)
Dept: PERIOP | Facility: HOSPITAL | Age: 48
End: 2023-12-27
Payer: COMMERCIAL

## 2023-12-27 ENCOUNTER — HOSPITAL ENCOUNTER (OUTPATIENT)
Facility: HOSPITAL | Age: 48
Setting detail: HOSPITAL OUTPATIENT SURGERY
Discharge: HOME | End: 2023-12-27
Attending: ORTHOPAEDIC SURGERY | Admitting: ORTHOPAEDIC SURGERY
Payer: COMMERCIAL

## 2023-12-27 ENCOUNTER — ANESTHESIA EVENT (OUTPATIENT)
Dept: PERIOP | Facility: HOSPITAL | Age: 48
End: 2023-12-27
Payer: COMMERCIAL

## 2023-12-27 VITALS
TEMPERATURE: 97.5 F | OXYGEN SATURATION: 99 % | DIASTOLIC BLOOD PRESSURE: 65 MMHG | SYSTOLIC BLOOD PRESSURE: 95 MMHG | RESPIRATION RATE: 16 BRPM | HEART RATE: 65 BPM

## 2023-12-27 DIAGNOSIS — M67.449 MUCOUS CYST OF FINGER: ICD-10-CM

## 2023-12-27 PROBLEM — R22.31 FINGER MASS, RIGHT: Status: ACTIVE | Noted: 2023-12-27

## 2023-12-27 LAB — B-HCG UR QL: NEGATIVE

## 2023-12-27 PROCEDURE — 25810000003 LACTATED RINGERS PER 1000 ML: Performed by: ORTHOPAEDIC SURGERY

## 2023-12-27 PROCEDURE — 25010000002 FENTANYL CITRATE (PF) 100 MCG/2ML SOLUTION: Performed by: NURSE ANESTHETIST, CERTIFIED REGISTERED

## 2023-12-27 PROCEDURE — 88307 TISSUE EXAM BY PATHOLOGIST: CPT | Performed by: ORTHOPAEDIC SURGERY

## 2023-12-27 PROCEDURE — 25010000002 CLINDAMYCIN PER 300 MG: Performed by: ORTHOPAEDIC SURGERY

## 2023-12-27 PROCEDURE — 81025 URINE PREGNANCY TEST: CPT | Performed by: ORTHOPAEDIC SURGERY

## 2023-12-27 PROCEDURE — 25010000002 MIDAZOLAM PER 1 MG: Performed by: ANESTHESIOLOGY

## 2023-12-27 PROCEDURE — 25010000002 PROPOFOL 10 MG/ML EMULSION: Performed by: NURSE ANESTHETIST, CERTIFIED REGISTERED

## 2023-12-27 RX ORDER — PROPOFOL 10 MG/ML
VIAL (ML) INTRAVENOUS AS NEEDED
Status: DISCONTINUED | OUTPATIENT
Start: 2023-12-27 | End: 2023-12-27 | Stop reason: SURG

## 2023-12-27 RX ORDER — MIDAZOLAM HYDROCHLORIDE 1 MG/ML
2 INJECTION INTRAMUSCULAR; INTRAVENOUS ONCE
Status: COMPLETED | OUTPATIENT
Start: 2023-12-27 | End: 2023-12-27

## 2023-12-27 RX ORDER — SODIUM CHLORIDE 0.9 % (FLUSH) 0.9 %
3 SYRINGE (ML) INJECTION EVERY 12 HOURS SCHEDULED
Status: DISCONTINUED | OUTPATIENT
Start: 2023-12-27 | End: 2023-12-27 | Stop reason: HOSPADM

## 2023-12-27 RX ORDER — MIDAZOLAM HYDROCHLORIDE 1 MG/ML
1 INJECTION INTRAMUSCULAR; INTRAVENOUS
Status: DISCONTINUED | OUTPATIENT
Start: 2023-12-27 | End: 2023-12-27 | Stop reason: HOSPADM

## 2023-12-27 RX ORDER — SODIUM CHLORIDE 9 MG/ML
40 INJECTION, SOLUTION INTRAVENOUS AS NEEDED
Status: DISCONTINUED | OUTPATIENT
Start: 2023-12-27 | End: 2023-12-27 | Stop reason: HOSPADM

## 2023-12-27 RX ORDER — NALOXONE HCL 0.4 MG/ML
0.4 VIAL (ML) INJECTION AS NEEDED
Status: DISCONTINUED | OUTPATIENT
Start: 2023-12-27 | End: 2023-12-27 | Stop reason: HOSPADM

## 2023-12-27 RX ORDER — LIDOCAINE HYDROCHLORIDE 20 MG/ML
INJECTION, SOLUTION EPIDURAL; INFILTRATION; INTRACAUDAL; PERINEURAL AS NEEDED
Status: DISCONTINUED | OUTPATIENT
Start: 2023-12-27 | End: 2023-12-27 | Stop reason: SURG

## 2023-12-27 RX ORDER — IBUPROFEN 600 MG/1
600 TABLET ORAL ONCE AS NEEDED
Status: DISCONTINUED | OUTPATIENT
Start: 2023-12-27 | End: 2023-12-27 | Stop reason: HOSPADM

## 2023-12-27 RX ORDER — DROPERIDOL 2.5 MG/ML
0.62 INJECTION, SOLUTION INTRAMUSCULAR; INTRAVENOUS ONCE AS NEEDED
Status: DISCONTINUED | OUTPATIENT
Start: 2023-12-27 | End: 2023-12-27 | Stop reason: HOSPADM

## 2023-12-27 RX ORDER — CLINDAMYCIN PHOSPHATE 900 MG/50ML
900 INJECTION, SOLUTION INTRAVENOUS ONCE
Status: COMPLETED | OUTPATIENT
Start: 2023-12-27 | End: 2023-12-27

## 2023-12-27 RX ORDER — SCOLOPAMINE TRANSDERMAL SYSTEM 1 MG/1
1 PATCH, EXTENDED RELEASE TRANSDERMAL ONCE
Status: DISCONTINUED | OUTPATIENT
Start: 2023-12-27 | End: 2023-12-27 | Stop reason: HOSPADM

## 2023-12-27 RX ORDER — FENTANYL CITRATE 50 UG/ML
INJECTION, SOLUTION INTRAMUSCULAR; INTRAVENOUS AS NEEDED
Status: DISCONTINUED | OUTPATIENT
Start: 2023-12-27 | End: 2023-12-27 | Stop reason: SURG

## 2023-12-27 RX ORDER — LABETALOL HYDROCHLORIDE 5 MG/ML
5 INJECTION, SOLUTION INTRAVENOUS
Status: DISCONTINUED | OUTPATIENT
Start: 2023-12-27 | End: 2023-12-27 | Stop reason: HOSPADM

## 2023-12-27 RX ORDER — ONDANSETRON 2 MG/ML
4 INJECTION INTRAMUSCULAR; INTRAVENOUS ONCE AS NEEDED
Status: DISCONTINUED | OUTPATIENT
Start: 2023-12-27 | End: 2023-12-27 | Stop reason: HOSPADM

## 2023-12-27 RX ORDER — SODIUM CHLORIDE 0.9 % (FLUSH) 0.9 %
10 SYRINGE (ML) INJECTION EVERY 12 HOURS SCHEDULED
Status: DISCONTINUED | OUTPATIENT
Start: 2023-12-27 | End: 2023-12-27 | Stop reason: HOSPADM

## 2023-12-27 RX ORDER — SODIUM CHLORIDE, SODIUM LACTATE, POTASSIUM CHLORIDE, CALCIUM CHLORIDE 600; 310; 30; 20 MG/100ML; MG/100ML; MG/100ML; MG/100ML
100 INJECTION, SOLUTION INTRAVENOUS CONTINUOUS PRN
Status: DISCONTINUED | OUTPATIENT
Start: 2023-12-27 | End: 2023-12-27 | Stop reason: HOSPADM

## 2023-12-27 RX ORDER — BUPIVACAINE HCL/0.9 % NACL/PF 0.125 %
PLASTIC BAG, INJECTION (ML) EPIDURAL AS NEEDED
Status: DISCONTINUED | OUTPATIENT
Start: 2023-12-27 | End: 2023-12-27 | Stop reason: SURG

## 2023-12-27 RX ORDER — LIDOCAINE HYDROCHLORIDE AND EPINEPHRINE 10; 10 MG/ML; UG/ML
INJECTION, SOLUTION INFILTRATION; PERINEURAL AS NEEDED
Status: DISCONTINUED | OUTPATIENT
Start: 2023-12-27 | End: 2023-12-27 | Stop reason: HOSPADM

## 2023-12-27 RX ORDER — SODIUM CHLORIDE 0.9 % (FLUSH) 0.9 %
10 SYRINGE (ML) INJECTION AS NEEDED
Status: DISCONTINUED | OUTPATIENT
Start: 2023-12-27 | End: 2023-12-27 | Stop reason: HOSPADM

## 2023-12-27 RX ORDER — SODIUM CHLORIDE, SODIUM LACTATE, POTASSIUM CHLORIDE, CALCIUM CHLORIDE 600; 310; 30; 20 MG/100ML; MG/100ML; MG/100ML; MG/100ML
100 INJECTION, SOLUTION INTRAVENOUS CONTINUOUS
Status: DISCONTINUED | OUTPATIENT
Start: 2023-12-27 | End: 2023-12-27 | Stop reason: HOSPADM

## 2023-12-27 RX ORDER — LIDOCAINE HYDROCHLORIDE 10 MG/ML
0.5 INJECTION, SOLUTION EPIDURAL; INFILTRATION; INTRACAUDAL; PERINEURAL ONCE AS NEEDED
Status: DISCONTINUED | OUTPATIENT
Start: 2023-12-27 | End: 2023-12-27 | Stop reason: HOSPADM

## 2023-12-27 RX ORDER — SODIUM CHLORIDE 0.9 % (FLUSH) 0.9 %
3-10 SYRINGE (ML) INJECTION AS NEEDED
Status: DISCONTINUED | OUTPATIENT
Start: 2023-12-27 | End: 2023-12-27 | Stop reason: HOSPADM

## 2023-12-27 RX ORDER — FLUMAZENIL 0.1 MG/ML
0.2 INJECTION INTRAVENOUS AS NEEDED
Status: DISCONTINUED | OUTPATIENT
Start: 2023-12-27 | End: 2023-12-27 | Stop reason: HOSPADM

## 2023-12-27 RX ORDER — MAGNESIUM HYDROXIDE 1200 MG/15ML
LIQUID ORAL AS NEEDED
Status: DISCONTINUED | OUTPATIENT
Start: 2023-12-27 | End: 2023-12-27 | Stop reason: HOSPADM

## 2023-12-27 RX ORDER — FENTANYL CITRATE 50 UG/ML
25 INJECTION, SOLUTION INTRAMUSCULAR; INTRAVENOUS
Status: DISCONTINUED | OUTPATIENT
Start: 2023-12-27 | End: 2023-12-27 | Stop reason: HOSPADM

## 2023-12-27 RX ORDER — HYDROCODONE BITARTRATE AND ACETAMINOPHEN 10; 325 MG/1; MG/1
1 TABLET ORAL EVERY 4 HOURS PRN
Status: DISCONTINUED | OUTPATIENT
Start: 2023-12-27 | End: 2023-12-27 | Stop reason: HOSPADM

## 2023-12-27 RX ORDER — ACETAMINOPHEN 500 MG
1000 TABLET ORAL ONCE
Status: COMPLETED | OUTPATIENT
Start: 2023-12-27 | End: 2023-12-27

## 2023-12-27 RX ORDER — HYDROCODONE BITARTRATE AND ACETAMINOPHEN 5; 325 MG/1; MG/1
1 TABLET ORAL ONCE AS NEEDED
Status: DISCONTINUED | OUTPATIENT
Start: 2023-12-27 | End: 2023-12-27 | Stop reason: HOSPADM

## 2023-12-27 RX ADMIN — MIDAZOLAM HYDROCHLORIDE 2 MG: 1 INJECTION, SOLUTION INTRAMUSCULAR; INTRAVENOUS at 07:33

## 2023-12-27 RX ADMIN — LIDOCAINE HYDROCHLORIDE 80 MG: 20 INJECTION, SOLUTION EPIDURAL; INFILTRATION; INTRACAUDAL; PERINEURAL at 08:02

## 2023-12-27 RX ADMIN — PROPOFOL INJECTABLE EMULSION 200 MG: 10 INJECTION, EMULSION INTRAVENOUS at 08:02

## 2023-12-27 RX ADMIN — Medication 100 MCG: at 08:23

## 2023-12-27 RX ADMIN — ACETAMINOPHEN TAB 500 MG 1000 MG: 500 TAB at 07:33

## 2023-12-27 RX ADMIN — Medication 100 MCG: at 08:12

## 2023-12-27 RX ADMIN — SODIUM CHLORIDE, POTASSIUM CHLORIDE, SODIUM LACTATE AND CALCIUM CHLORIDE 100 ML/HR: 600; 310; 30; 20 INJECTION, SOLUTION INTRAVENOUS at 06:50

## 2023-12-27 RX ADMIN — Medication 100 MCG: at 08:29

## 2023-12-27 RX ADMIN — CLINDAMYCIN PHOSPHATE 900 MG: 900 INJECTION, SOLUTION INTRAVENOUS at 08:09

## 2023-12-27 RX ADMIN — SCOPALAMINE 1 PATCH: 1 PATCH, EXTENDED RELEASE TRANSDERMAL at 07:33

## 2023-12-27 RX ADMIN — FENTANYL CITRATE 100 MCG: 50 INJECTION, SOLUTION INTRAMUSCULAR; INTRAVENOUS at 08:19

## 2023-12-27 NOTE — H&P
[facility]   HISTORY AND PHYSICAL    Patient Name: Aysha Barros  : 1975  MRN: [id number]  Primary Care Physician:  Elizabet Brice APRN  Date of admission: 2023    Subjective   Subjective     Chief Complaint: Right middle finger mass    History of Present Illness  Ms. Barros is a 48-year-old female who presents today for planned right middle finger marginal mass excision.  She reports no significant interval medical changes.    Review of Systems   Complete review of systems was reviewed today and is unremarkable except for HPI.    Personal History     Past Medical History:   Diagnosis Date    Anxiety     Depression     Meniere disease, right     Migraines     Seasonal allergies        No past surgical history on file.    History reviewed. No pertinent family history.    Social History     Socioeconomic History    Marital status:    Tobacco Use    Smoking status: Never     Passive exposure: Never    Smokeless tobacco: Never   Vaping Use    Vaping Use: Never used   Substance and Sexual Activity    Alcohol use: Not Currently    Drug use: Never    Sexual activity: Never       Prior to Admission medications    Medication Sig Start Date End Date Taking? Authorizing Provider   citalopram (CeleXA) 20 MG tablet Take 1 tablet by mouth Daily. 23  Yes Yumiko Zazueta APRN   montelukast (Singulair) 10 MG tablet Take 1 tablet by mouth Every Night. 11/3/23  Yes Elizabet Birce APRN   topiramate (Topamax) 50 MG tablet BID 11/3/23  Yes Elizabet Brice APRN   busPIRone (BUSPAR) 10 MG tablet Take 1 tablet by mouth 3 (Three) Times a Day As Needed (anxiety). 21   Yumiko Zazueta APRN   fluticasone (Flonase) 50 MCG/ACT nasal spray 2 sprays into the nostril(s) as directed by provider Daily. 22   Yumiko Zazueta APRN   glycopyrrolate (ROBINUL) 1 MG tablet Take 1 tablet by mouth Daily As Needed. 23   Provider, MD Teofilo   meclizine (ANTIVERT) 12.5 MG  tablet Take 1 tablet by mouth 3 (Three) Times a Day As Needed for Dizziness. 4/1/22   Charlee Meehan APRN   rizatriptan (Maxalt) 10 MG tablet Take 1 tablet by mouth 1 (One) Time As Needed for Migraine. May repeat in 2 hours if needed 10/11/22   Yumiko Zazueta APRN       Cefaclor    Objective    Objective     Vitals:   /97 (BP Location: Right arm, Patient Position: Sitting)   Pulse 77   Temp 97.4 °F (36.3 °C) (Temporal)   Resp 16   SpO2 100%     Physical Exam    General: Awake, alert, no acute distress  HEENT: Head is normocephalic, atraumatic.  No gross visual or auditory acuity deficits.  Respiratory: Nonlabored  Cardiovascular: Regular rate  RUE: Mass to dorsal aspect of right middle finger distal phalanx, stable.  No open skin wounds or additional lesions.  Stable neurovascular exam to right hand.  Psychiatric: Calm and cooperative  Neurologic: Alert and oriented x3      Assessment & Plan   Assessment / Plan     Brief Patient Summary:  Aysha Barros is a 48 y.o. female who presents today for planned right middle finger marginal mass excision.    Active Hospital Problems:  There are no active hospital problems to display for this patient.    Plan:   See above      Jonathan Kevin MD

## 2023-12-27 NOTE — ANESTHESIA POSTPROCEDURE EVALUATION
Patient: Aysha Barros    Procedure Summary       Date: 12/27/23 Room / Location:  [facility] OR 08 /  [facility]    Anesthesia Start: 0759 Anesthesia Stop: 0838    Procedure: RIGHT MIDDLE FINGER MARGINAL MASS EXCISION (Right) Diagnosis: (M67.449)    Surgeons: Jonathan Kevin MD Provider: Yessenia Calzada CRNA    Anesthesia Type: general ASA Status: 2            Anesthesia Type: general    Vitals  Vitals Value Taken Time   BP 83/49 12/27/23 0901   Temp 97.5 °F (36.4 °C) 12/27/23 0837   Pulse 81 12/27/23 0906   Resp 16 12/27/23 0900   SpO2 98 % 12/27/23 0906   Vitals shown include unfiled device data.        Post Anesthesia Care and Evaluation    Patient location during evaluation: PACU  Patient participation: complete - patient participated  Level of consciousness: awake and alert  Pain management: adequate    Airway patency: patent  Anesthetic complications: No anesthetic complications    Cardiovascular status: acceptable  Respiratory status: acceptable  Hydration status: acceptable    Comments: Blood pressure (!) 85/54, pulse 75, temperature 97.5 °F (36.4 °C), temperature source Temporal, resp. rate 16, SpO2 99%, not currently breastfeeding.    Pt discharged from PACU based on mervin score >8  No anesthesia care post op

## 2023-12-27 NOTE — ANESTHESIA PROCEDURE NOTES
Airway  Urgency: elective    Date/Time: 12/27/2023 8:03 AM  Airway not difficult    General Information and Staff    Patient location during procedure: OR  CRNA/CAA: Yessenia Calzada CRNA    Indications and Patient Condition  Indications for airway management: airway protection    Preoxygenated: yes  Mask difficulty assessment: 0 - not attempted    Final Airway Details  Final airway type: supraglottic airway      Successful airway: I-gel  Size 3     Number of attempts at approach: 1  Assessment: lips, teeth, and gum same as pre-op and atraumatic intubation

## 2023-12-27 NOTE — ANESTHESIA PREPROCEDURE EVALUATION
Anesthesia Evaluation     Patient summary reviewed   no history of anesthetic complications:   NPO Solid Status: > 8 hours             Airway   Mallampati: I  Dental - normal exam     Pulmonary    (-) not a smoker  Cardiovascular - negative cardio ROS        Neuro/Psych  (+) headaches, dizziness/light headedness  GI/Hepatic/Renal/Endo    (+) obesity  (-) diabetes    Musculoskeletal     Abdominal    Substance History      OB/GYN          Other                      Anesthesia Plan    ASA 2     general     (Denies fmd, but MRA does comment on carotid artery)  intravenous induction     Anesthetic plan, risks, benefits, and alternatives have been provided, discussed and informed consent has been obtained with: patient.      CODE STATUS:

## 2023-12-27 NOTE — OP NOTE
Pt Name: Aysha Barros  MRN: [id number]  YOB: 1975  Date: 12/27/2023     SURGEON: Jonathan Kevin MD    ASSISTANT: NONE    PREOPERATIVE DIAGNOSIS:    1.  Right middle finger mass    POSTOPERATIVE DIAGNOSIS:  1.  Right middle finger mass    PROCEDURE PERFORMED:   1.  Marginal mass excision right middle finger  2.  Right middle finger DIP joint arthrotomy    IMPLANTS: None    ANESTHESIA USED: General anesthesia with local digital block    OPERATIVE INDICATIONS: Ms. Barros is a 48-year-old female who was evaluated and treated in the clinical setting for a chronic, painful right middle finger DIP joint mass, likely a mucoid cyst.  Mass was becoming more painful and causing a nail deformity.  Ultimately, patient failed conservative management and elected to proceed with operative intervention.  Risk, benefits and alternatives were discussed.  Surgery risks including, but not limited to, bleeding, infection, numbness, permanent nail deformity, mass recurrence, failure to alleviate any or all of her preoperative symptoms, skin buttonholing, injury to terminal extensor tendon and issues with anesthesia were discussed.  All questions were answered preoperatively.  Written and informed consent were obtained.    ESTIMATED BLOOD LOSS: <10 mL    DRAINS: None     COMPLICATIONS: No intra-operative complications    SPECIMENS:   1.  Right middle finger soft tissue mass, cystic in nature measuring less than 1 x 1 cm    PROCEDURE in DETAIL:  Patient was met in the preoperative holding area where the correct patient, procedure and side were confirmed.  The operative side was marked by myself with the patient's agreement.  The operative consent was signed by myself.  Patient was then transported to the operating room and placed supine on the operating room table.  She was secured to the table and all bony prominences were padded.  A formal timeout was held in which myself, the operating team and patient, again, identify the  correct patient, procedure and side.  A nonsterile tourniquet was placed about the right forearm.  General was then induced.  Preoperative antibiotics were administered within 1 hour of incision.  The right upper extremity was then prepped and draped in a normal sterile fashion.  A skin marker was used to delineate a hockey-stick shaped incision overlying the dorsal aspect of the DIP joint extending distally along the ulnar paronychial skin fold.  Prior to initiation of the procedure, a right middle finger digital block consisting of approximately 8 cc of 1% lidocaine with epinephrine were injected.  The operative upper extremity was then exsanguinated with an Esmarch and the tourniquet was inflated to 250 mmHg for less than 20 minutes.  A 15 blade scalpel was used to incise only through the skin.  Blunt, scissor tip dissection was carried through the subcutaneous tissue onto the dorsal aspect of the terminal extensor tendon which was identified and protected throughout the remainder of the procedure.  A full thickness eponychial skin flap was then elevated with care to prevent buttonholing.  As the skin fold was lifted distally, care was taken to protect the underlying germinal matrix.  A cystic appearing structure was encountered and inadvertently punctured with return of clear, viscous fluid.  Marginal mass excision was then performed and this was sent for permanent specimen.  This was traced back to the DIP joint and a DIP joint arthrotomy was made along the ulnar aspect of the joint between the terminal extensor tendon and collateral ligaments.  The dorsal osteophyte was debrided with a ronguer.  There was grade IV chondromalacia appreciated to the radial aspect of the articular portions of the middle and distal phalanges.  There were no additional masses appreciated and the terminal extensor tendon insertion was intact.  At this point, the wound was irrigated with normal saline.  Arthrotomy was reapproximated  with buried Monocryl suture.  Skin edges were reapproximated with simple 4-0 nylon sutures.  A soft dressing consisting of Xeroform, 4 x 4 and Coban were placed to the operative finger.  The tourniquet was deflated.  Adequate capillary refill return to the operative finger after tourniquet deflation and dressing application.  Anesthesia was reversed, she was transferred to the hospital bed and taken to recovery in stable condition.  Patient tolerated the procedure well and was without intraoperative complication.  All counts at the end of procedure were correct.    POSTOPERATIVE PLAN:  1) D/c home with family  2) RTC 2 wks for wound check  3) Elevate RUE  4) Pain medication as prescribed

## 2023-12-28 LAB
CYTO UR: NORMAL
LAB AP CASE REPORT: NORMAL
Lab: NORMAL
PATH REPORT.FINAL DX SPEC: NORMAL
PATH REPORT.GROSS SPEC: NORMAL

## 2024-01-05 RX ORDER — CITALOPRAM HYDROBROMIDE 10 MG/1
10 TABLET ORAL DAILY
Qty: 30 TABLET | Refills: 0 | Status: SHIPPED | OUTPATIENT
Start: 2024-01-05

## 2024-01-23 DIAGNOSIS — G43.809 OTHER MIGRAINE WITHOUT STATUS MIGRAINOSUS, NOT INTRACTABLE: ICD-10-CM

## 2024-01-24 RX ORDER — TOPIRAMATE 50 MG/1
TABLET, FILM COATED ORAL
Qty: 180 TABLET | OUTPATIENT
Start: 2024-01-24

## 2024-01-29 DIAGNOSIS — G43.809 OTHER MIGRAINE WITHOUT STATUS MIGRAINOSUS, NOT INTRACTABLE: ICD-10-CM

## 2024-01-29 RX ORDER — TOPIRAMATE 50 MG/1
TABLET, FILM COATED ORAL
Qty: 60 TABLET | Refills: 2 | OUTPATIENT
Start: 2024-01-29

## 2024-01-29 NOTE — TELEPHONE ENCOUNTER
Caller: Aysha Barros     Relationship: SELF     Best call back number:     [phone]        Requested Prescriptions:     topiramate (Topamax) 50 MG tablet              Pharmacy where request should be sent:  [facility]/pharmacy #[id number] - [place] - [address] - [phone]  - [phone]  [phone]     Last office visit with prescribing clinician: 11/14/2023   Last telemedicine visit with prescribing clinician: Visit date not found   Next office visit with prescribing clinician: Visit date not found     Additional details provided by patient:  CALL BACK IF APPOINTMENT IS NEEDED     Does the patient have less than a 3 day supply:  [x] Yes  [] No    Would you like a call back once the refill request has been completed: [x] Yes [] No    If the office needs to give you a call back, can they leave a voicemail: [x] Yes [] No    Leslie Cordero Rep   01/29/24 16:08 CST

## 2024-02-06 ENCOUNTER — TELEPHONE (OUTPATIENT)
Dept: FAMILY MEDICINE CLINIC | Facility: CLINIC | Age: 49
End: 2024-02-06
Payer: COMMERCIAL

## 2024-02-19 ENCOUNTER — OFFICE VISIT (OUTPATIENT)
Dept: FAMILY MEDICINE CLINIC | Facility: CLINIC | Age: 49
End: 2024-02-19
Payer: COMMERCIAL

## 2024-02-19 ENCOUNTER — TELEPHONE (OUTPATIENT)
Dept: FAMILY MEDICINE CLINIC | Facility: CLINIC | Age: 49
End: 2024-02-19

## 2024-02-19 VITALS
HEIGHT: 65 IN | WEIGHT: 158 LBS | BODY MASS INDEX: 26.33 KG/M2 | DIASTOLIC BLOOD PRESSURE: 80 MMHG | RESPIRATION RATE: 20 BRPM | SYSTOLIC BLOOD PRESSURE: 139 MMHG

## 2024-02-19 DIAGNOSIS — G43.809 OTHER MIGRAINE WITHOUT STATUS MIGRAINOSUS, NOT INTRACTABLE: Primary | ICD-10-CM

## 2024-02-19 DIAGNOSIS — F41.8 MIXED ANXIETY AND DEPRESSIVE DISORDER: ICD-10-CM

## 2024-02-19 DIAGNOSIS — T78.40XD ALLERGY, SUBSEQUENT ENCOUNTER: ICD-10-CM

## 2024-02-19 DIAGNOSIS — R22.0 CHEEK SWELLING: ICD-10-CM

## 2024-02-19 DIAGNOSIS — H65.93 FLUID LEVEL BEHIND TYMPANIC MEMBRANE OF BOTH EARS: ICD-10-CM

## 2024-02-19 RX ORDER — TOPIRAMATE 50 MG/1
TABLET, FILM COATED ORAL
Qty: 60 TABLET | Refills: 5 | Status: SHIPPED | OUTPATIENT
Start: 2024-02-19

## 2024-02-19 RX ORDER — MONTELUKAST SODIUM 10 MG/1
10 TABLET ORAL NIGHTLY
Qty: 30 TABLET | Refills: 5 | Status: SHIPPED | OUTPATIENT
Start: 2024-02-19

## 2024-02-19 RX ORDER — CITALOPRAM 20 MG/1
20 TABLET ORAL DAILY
Qty: 30 TABLET | Refills: 5 | Status: SHIPPED | OUTPATIENT
Start: 2024-02-19

## 2024-02-19 RX ORDER — FLUTICASONE PROPIONATE 50 MCG
2 SPRAY, SUSPENSION (ML) NASAL DAILY
Qty: 15.8 ML | Refills: 2 | Status: SHIPPED | OUTPATIENT
Start: 2024-02-19

## 2024-02-19 RX ORDER — AMOXICILLIN AND CLAVULANATE POTASSIUM 875; 125 MG/1; MG/1
1 TABLET, FILM COATED ORAL 2 TIMES DAILY
Qty: 20 TABLET | Refills: 0 | Status: SHIPPED | OUTPATIENT
Start: 2024-02-19 | End: 2024-02-29

## 2024-02-19 NOTE — PROGRESS NOTES
"Chief Complaint  Anxiety and Migraine    Subjective    History of Present Illness      Patient presents to [facility] for   History of Present Illness  Chronic refills, migraines and anxiety/depression. Pt states anxiety is controlled. Doing well on Celexa 20mg. She stats her migraines are controlled. Doing well.  She woke up this morning with R side of cheek swollen. Throbbing tooth pain. She had a dentist appt last week but cancelled and it has been rescheduled for next week.        Review of Systems    I have reviewed and agree with the HPI and ROS information as above.  Elizabet Ashleylakshmi Brice, UMER     Objective   Vital Signs:   /80   Resp 20   Ht 165.1 cm (65\")   Wt 71.7 kg (158 lb)   BMI 26.29 kg/m²     BMI is >= 25 and <30. (Overweight) The following options were offered after discussion;: information on healthy weight added to patient's after visit summary       Physical Exam  Constitutional:       Appearance: Normal appearance. She is well-developed.   HENT:      Head: Normocephalic and atraumatic.      Jaw: Swelling (mild right sided) present.        Right Ear: Tympanic membrane, ear canal and external ear normal.      Left Ear: Tympanic membrane, ear canal and external ear normal.      Nose: Nose normal. No septal deviation, nasal tenderness or congestion.      Mouth/Throat:      Lips: Pink. No lesions.      Mouth: Mucous membranes are moist. No oral lesions.      Dentition: Normal dentition.      Pharynx: Oropharynx is clear. No pharyngeal swelling, oropharyngeal exudate or posterior oropharyngeal erythema.   Eyes:      General: Lids are normal. Vision grossly intact. No scleral icterus.        Right eye: No discharge.         Left eye: No discharge.      Extraocular Movements: Extraocular movements intact.      Conjunctiva/sclera: Conjunctivae normal.      Right eye: Right conjunctiva is not injected.      Left eye: Left conjunctiva is not injected.      Pupils: Pupils " are equal, round, and reactive to light.   Neck:      Thyroid: No thyroid mass.      Trachea: Trachea normal.   Cardiovascular:      Rate and Rhythm: Normal rate and regular rhythm.      Heart sounds: Normal heart sounds. No murmur heard.     No gallop.   Pulmonary:      Effort: Pulmonary effort is normal.      Breath sounds: Normal breath sounds and air entry. No wheezing, rhonchi or rales.   Abdominal:      General: There is no distension.      Palpations: Abdomen is soft. There is no mass.      Tenderness: There is no abdominal tenderness. There is no right CVA tenderness, left CVA tenderness, guarding or rebound.   Musculoskeletal:         General: No tenderness or deformity. Normal range of motion.      Cervical back: Full passive range of motion without pain, normal range of motion and neck supple.      Thoracic back: Normal.      Right lower leg: No edema.      Left lower leg: No edema.   Skin:     General: Skin is warm and dry.      Coloration: Skin is not jaundiced.      Findings: No rash.   Neurological:      Mental Status: She is alert and oriented to person, place, and time.      Sensory: Sensation is intact.      Motor: Motor function is intact.      Coordination: Coordination is intact.      Gait: Gait is intact.      Deep Tendon Reflexes: Reflexes are normal and symmetric.   Psychiatric:         Mood and Affect: Mood and affect normal.         Judgment: Judgment normal.              PHQ-2 Depression Screening  Little interest or pleasure in doing things? 0-->not at all   Feeling down, depressed, or hopeless? 0-->not at all   PHQ-2 Total Score 0     PHQ-9 Depression Screening  Little interest or pleasure in doing things? 0-->not at all   Feeling down, depressed, or hopeless? 0-->not at all   Trouble falling or staying asleep, or sleeping too much?     Feeling tired or having little energy?     Poor appetite or overeating?     Feeling bad about yourself - or that you are a failure or have let yourself  or your family down?     Trouble concentrating on things, such as reading the newspaper or watching television?     Moving or speaking so slowly that other people could have noticed? Or the opposite - being so fidgety or restless that you have been moving around a lot more than usual?     Thoughts that you would be better off dead, or of hurting yourself in some way?     PHQ-9 Total Score 0   If you checked off any problems, how difficult have these problems made it for you to do your work, take care of things at home, or get along with other people?        Result Review  Data Reviewed:                   Assessment and Plan      Diagnoses and all orders for this visit:    1. Other migraine without status migrainosus, not intractable (Primary)  Comments:  Stable on topamax daily and maxalt prn.  Orders:  -     topiramate (Topamax) 50 MG tablet; BID  Dispense: 60 tablet; Refill: 5    2. Mixed anxiety and depressive disorder  Comments:  Stable on Celexa.  Orders:  -     citalopram (CeleXA) 20 MG tablet; Take 1 tablet by mouth Daily.  Dispense: 30 tablet; Refill: 5    3. Fluid level behind tympanic membrane of both ears  Comments:  refill nasal spray.  Orders:  -     fluticasone (Flonase) 50 MCG/ACT nasal spray; 2 sprays into the nostril(s) as directed by provider Daily.  Dispense: 15.8 mL; Refill: 2    4. Allergy, subsequent encounter  Comments:  Refill and cont singulair.  Orders:  -     montelukast (Singulair) 10 MG tablet; Take 1 tablet by mouth Every Night.  Dispense: 30 tablet; Refill: 5    5. Cheek swelling  Comments:  Likely dental related. Mild in nature. Apt with dentist next week. Start salt water rinses and Augmentin.  Orders:  -     amoxicillin-clavulanate (AUGMENTIN) 875-125 MG per tablet; Take 1 tablet by mouth 2 (Two) Times a Day for 10 days.  Dispense: 20 tablet; Refill: 0            Follow Up   Return in about 6 months (around 8/19/2024) for Annual physical.  Patient was given instructions and  counseling regarding her condition or for health maintenance advice. Please see specific information pulled into the AVS if appropriate.

## 2024-02-19 NOTE — TELEPHONE ENCOUNTER
Caller: Aysha Barros    Relationship to patient: Self    Best call back number: [phone]     Patient is needing: TO KNOW IF [facility] WILL CALL IN THE ORAL RINSE THEY DISCUSSED DURING TODAY APPOINTMENT. PHARM SAYS THEY NEVER GOT IT

## 2024-04-25 ENCOUNTER — LAB (OUTPATIENT)
Dept: LAB | Facility: HOSPITAL | Age: 49
End: 2024-04-25
Payer: COMMERCIAL

## 2024-04-25 ENCOUNTER — OFFICE VISIT (OUTPATIENT)
Dept: FAMILY MEDICINE CLINIC | Facility: CLINIC | Age: 49
End: 2024-04-25
Payer: COMMERCIAL

## 2024-04-25 VITALS
DIASTOLIC BLOOD PRESSURE: 90 MMHG | WEIGHT: 156 LBS | HEART RATE: 99 BPM | TEMPERATURE: 98.2 F | BODY MASS INDEX: 25.99 KG/M2 | HEIGHT: 65 IN | SYSTOLIC BLOOD PRESSURE: 134 MMHG | OXYGEN SATURATION: 98 % | RESPIRATION RATE: 20 BRPM

## 2024-04-25 DIAGNOSIS — R05.1 ACUTE COUGH: ICD-10-CM

## 2024-04-25 DIAGNOSIS — H65.93 FLUID LEVEL BEHIND TYMPANIC MEMBRANE OF BOTH EARS: ICD-10-CM

## 2024-04-25 DIAGNOSIS — R50.9 FEVER, UNSPECIFIED FEVER CAUSE: ICD-10-CM

## 2024-04-25 DIAGNOSIS — J01.00 ACUTE NON-RECURRENT MAXILLARY SINUSITIS: Primary | ICD-10-CM

## 2024-04-25 LAB
B PARAPERT DNA SPEC QL NAA+PROBE: NOT DETECTED
B PERT DNA SPEC QL NAA+PROBE: NOT DETECTED
C PNEUM DNA NPH QL NAA+NON-PROBE: NOT DETECTED
FLUAV SUBTYP SPEC NAA+PROBE: NOT DETECTED
FLUBV RNA ISLT QL NAA+PROBE: NOT DETECTED
HADV DNA SPEC NAA+PROBE: NOT DETECTED
HCOV 229E RNA SPEC QL NAA+PROBE: DETECTED
HCOV HKU1 RNA SPEC QL NAA+PROBE: NOT DETECTED
HCOV NL63 RNA SPEC QL NAA+PROBE: NOT DETECTED
HCOV OC43 RNA SPEC QL NAA+PROBE: NOT DETECTED
HMPV RNA NPH QL NAA+NON-PROBE: NOT DETECTED
HPIV1 RNA ISLT QL NAA+PROBE: NOT DETECTED
HPIV2 RNA SPEC QL NAA+PROBE: NOT DETECTED
HPIV3 RNA NPH QL NAA+PROBE: NOT DETECTED
HPIV4 P GENE NPH QL NAA+PROBE: NOT DETECTED
M PNEUMO IGG SER IA-ACNC: NOT DETECTED
RHINOVIRUS RNA SPEC NAA+PROBE: NOT DETECTED
RSV RNA NPH QL NAA+NON-PROBE: NOT DETECTED
SARS-COV-2 RNA NPH QL NAA+NON-PROBE: NOT DETECTED

## 2024-04-25 PROCEDURE — 96372 THER/PROPH/DIAG INJ SC/IM: CPT | Performed by: NURSE PRACTITIONER

## 2024-04-25 PROCEDURE — 99213 OFFICE O/P EST LOW 20 MIN: CPT | Performed by: NURSE PRACTITIONER

## 2024-04-25 PROCEDURE — 0202U NFCT DS 22 TRGT SARS-COV-2: CPT

## 2024-04-25 RX ORDER — AZITHROMYCIN 250 MG/1
TABLET, FILM COATED ORAL
Qty: 6 TABLET | Refills: 0 | Status: SHIPPED | OUTPATIENT
Start: 2024-04-25

## 2024-04-25 RX ORDER — DEXTROMETHORPHAN HYDROBROMIDE AND PROMETHAZINE HYDROCHLORIDE 15; 6.25 MG/5ML; MG/5ML
5 SYRUP ORAL NIGHTLY PRN
Qty: 118 ML | Refills: 0 | Status: SHIPPED | OUTPATIENT
Start: 2024-04-25

## 2024-04-25 RX ORDER — DEXAMETHASONE SODIUM PHOSPHATE 4 MG/ML
8 INJECTION, SOLUTION INTRA-ARTICULAR; INTRALESIONAL; INTRAMUSCULAR; INTRAVENOUS; SOFT TISSUE ONCE
Status: COMPLETED | OUTPATIENT
Start: 2024-04-25 | End: 2024-04-25

## 2024-04-25 RX ORDER — BENZONATATE 100 MG/1
100 CAPSULE ORAL 3 TIMES DAILY PRN
Qty: 90 CAPSULE | Refills: 0 | Status: SHIPPED | OUTPATIENT
Start: 2024-04-25

## 2024-04-25 RX ADMIN — DEXAMETHASONE SODIUM PHOSPHATE 8 MG: 4 INJECTION, SOLUTION INTRA-ARTICULAR; INTRALESIONAL; INTRAMUSCULAR; INTRAVENOUS; SOFT TISSUE at 11:24

## 2024-04-25 NOTE — PROGRESS NOTES
"Chief Complaint  Cough, Nasal Congestion, Sore Throat, Hoarse, and low grade fever    Subjective    History of Present Illness      Patient presents to [facility] for   History of Present Illness  Pt is here today c/o of sore throat, cough, nasal congestion, hoarseness x 4 days.  Pt states she had a low grade the other day but no fever detected a this time.  Pt states hse has not had any ibuprofen or acetaminophen today.       Review of Systems   Constitutional:  Positive for fever.   HENT:  Positive for congestion and sore throat.    Eyes: Negative.    Respiratory:  Positive for cough.    Cardiovascular: Negative.    Gastrointestinal: Negative.    Endocrine: Negative.    Genitourinary: Negative.    Musculoskeletal: Negative.    Skin: Negative.    Allergic/Immunologic: Negative.    Neurological: Negative.    Hematological: Negative.    Psychiatric/Behavioral: Negative.         I have reviewed and agree with the HPI and ROS information as above.  Yumiko Zazueta, APRN     Objective   Vital Signs:   /90   Pulse 99   Temp 98.2 °F (36.8 °C)   Resp 20   Ht 165.1 cm (65\")   Wt 70.8 kg (156 lb)   SpO2 98%   BMI 25.96 kg/m²            Physical Exam  Constitutional:       Appearance: Normal appearance. She is well-developed.      Comments: Overweight    HENT:      Head: Normocephalic and atraumatic.      Right Ear: External ear normal. A middle ear effusion is present.      Left Ear: External ear normal. A middle ear effusion is present.      Nose: Nose normal. No nasal tenderness or congestion.      Mouth/Throat:      Lips: Pink. No lesions.      Mouth: Mucous membranes are moist. No oral lesions.      Dentition: Normal dentition.      Pharynx: Oropharynx is clear. No pharyngeal swelling, oropharyngeal exudate or posterior oropharyngeal erythema.   Eyes:      General: Lids are normal. Vision grossly intact. No scleral icterus.        Right eye: No discharge.         Left eye: No " discharge.      Extraocular Movements: Extraocular movements intact.      Conjunctiva/sclera: Conjunctivae normal.      Right eye: Right conjunctiva is not injected.      Left eye: Left conjunctiva is not injected.      Pupils: Pupils are equal, round, and reactive to light.   Cardiovascular:      Rate and Rhythm: Normal rate and regular rhythm.      Heart sounds: Normal heart sounds. No murmur heard.     No gallop.   Pulmonary:      Effort: Pulmonary effort is normal.      Breath sounds: Normal breath sounds and air entry. No wheezing, rhonchi or rales.   Musculoskeletal:         General: No tenderness or deformity. Normal range of motion.      Cervical back: Full passive range of motion without pain, normal range of motion and neck supple.      Right lower leg: No edema.      Left lower leg: No edema.   Skin:     General: Skin is warm and dry.      Coloration: Skin is not jaundiced.      Findings: No rash.   Neurological:      Mental Status: She is alert and oriented to person, place, and time.      Sensory: Sensation is intact.      Motor: Motor function is intact.      Coordination: Coordination is intact.      Gait: Gait is intact.   Psychiatric:         Attention and Perception: Attention normal.         Mood and Affect: Mood and affect normal.         Behavior: Behavior is not hyperactive. Behavior is cooperative.         Thought Content: Thought content normal.         Judgment: Judgment normal.          AVNI-7:      PHQ-2 Depression Screening  Little interest or pleasure in doing things?     Feeling down, depressed, or hopeless?     PHQ-2 Total Score       PHQ-9 Depression Screening  Little interest or pleasure in doing things?     Feeling down, depressed, or hopeless?     Trouble falling or staying asleep, or sleeping too much?     Feeling tired or having little energy?     Poor appetite or overeating?     Feeling bad about yourself - or that you are a failure or have let yourself or your family down?      Trouble concentrating on things, such as reading the newspaper or watching television?     Moving or speaking so slowly that other people could have noticed? Or the opposite - being so fidgety or restless that you have been moving around a lot more than usual?     Thoughts that you would be better off dead, or of hurting yourself in some way?     PHQ-9 Total Score     If you checked off any problems, how difficult have these problems made it for you to do your work, take care of things at home, or get along with other people?        Result Review  Data Reviewed:   The following data was reviewed by: UMER Walsh on 04/25/2024:  Respiratory Panel PCR w/COVID-19(SARS-CoV-2) [facility]/PAD/COR/[facility]MA In-House, NP Swab in UTM/VTM, 2 HR TAT - Swab, Nasopharynx (04/25/2024 10:59)            Assessment and Plan      Diagnoses and all orders for this visit:    1. Acute non-recurrent maxillary sinusitis (Primary)  -     dexAMETHasone (DECADRON) injection 8 mg  -     azithromycin (Zithromax Z-Kenneth) 250 MG tablet; Take 2 tablets by mouth on day 1, then 1 tablet daily on days 2-5  Dispense: 6 tablet; Refill: 0    2. Fever, unspecified fever cause  -     Respiratory Panel PCR w/COVID-19(SARS-CoV-2) [facility]LLER[facility]/PAD/COR/[facility]MA In-House, NP Swab in UTM/VTM, 2 HR TAT - Swab, Nasopharynx; Future    3. Acute cough  -     promethazine-dextromethorphan (PROMETHAZINE-DM) 6.25-15 MG/5ML syrup; Take 5 mL by mouth At Night As Needed for Cough.  Dispense: 118 mL; Refill: 0  -     benzonatate (Tessalon Perles) 100 MG capsule; Take 1 capsule by mouth 3 (Three) Times a Day As Needed for Cough.  Dispense: 90 capsule; Refill: 0    4. Fluid level behind tympanic membrane of both ears        Patient here today with complaints of sinus congestion, drainage, sore throat, and cough that began 4 days ago.  Her voice is very hoarse at this time.  She states that he loses her voice every time she has any type of sinus issue.  She states her cough  is dry and she has also had a low-grade temp.  Fluid noted behind TMs bilaterally on exam meant no infection noted.  Patient actively having a dry cough in office.    Plan:    1.  Offered respiratory panel swab patient is agreeable to this.  Will collect at this time.  2.  Steroid injection given in office.  3.  Will go ahead and send in a Z-Kenneth.  Patient will wait to start this until she receives respiratory panel results.  4.  Promethazine DM cough syrup and Tessalon Perles sent for cough.  Encouraged to use Flonase nasal spray as well.  5.  Follow-up if symptoms do not improve or become worse.      Follow Up   Return if symptoms worsen or fail to improve.  Patient was given instructions and counseling regarding her condition or for health maintenance advice. Please see specific information pulled into the AVS if appropriate.

## 2024-04-25 NOTE — PROGRESS NOTES
After obtaining consent, and per orders of UMER Fisher, an injection of Decadron 8 mg was given by Isacc Cooney MA and administered to the left ventrogluteal IM. Patient instructed to remain in clinic for 20 minutes afterwards, and to report any adverse reaction to me immediately. Pt tolerated well with no adverse reactions.

## 2024-04-26 ENCOUNTER — TELEPHONE (OUTPATIENT)
Dept: FAMILY MEDICINE CLINIC | Facility: CLINIC | Age: 49
End: 2024-04-26
Payer: COMMERCIAL

## 2024-04-26 NOTE — TELEPHONE ENCOUNTER
----- Message from Angela SPANGLER sent at 4/26/2024  7:27 AM CDT -----  Old coronavirus detected on respiratory panel swab. Treat supportively, hydrate and rest. Abx were sent yesterday, if she has started them that is fine, will likely not help much since this is viral. F/u if no improvements.

## 2024-04-26 NOTE — PROGRESS NOTES
Old coronavirus detected on respiratory panel swab. Treat supportively, hydrate and rest. Abx were sent yesterday, if she has started them that is fine, will likely not help much since this is viral. F/u if no improvements.

## 2024-08-05 DIAGNOSIS — G43.809 OTHER MIGRAINE WITHOUT STATUS MIGRAINOSUS, NOT INTRACTABLE: ICD-10-CM

## 2024-08-06 RX ORDER — TOPIRAMATE 50 MG/1
TABLET, FILM COATED ORAL
Qty: 180 TABLET | Refills: 1 | OUTPATIENT
Start: 2024-08-06

## 2024-08-30 ENCOUNTER — OFFICE VISIT (OUTPATIENT)
Dept: FAMILY MEDICINE CLINIC | Facility: CLINIC | Age: 49
End: 2024-08-30
Payer: COMMERCIAL

## 2024-08-30 VITALS
RESPIRATION RATE: 20 BRPM | SYSTOLIC BLOOD PRESSURE: 132 MMHG | HEIGHT: 65 IN | WEIGHT: 165 LBS | HEART RATE: 96 BPM | BODY MASS INDEX: 27.49 KG/M2 | DIASTOLIC BLOOD PRESSURE: 84 MMHG

## 2024-08-30 DIAGNOSIS — F41.8 MIXED ANXIETY AND DEPRESSIVE DISORDER: Primary | ICD-10-CM

## 2024-08-30 DIAGNOSIS — G43.809 OTHER MIGRAINE WITHOUT STATUS MIGRAINOSUS, NOT INTRACTABLE: ICD-10-CM

## 2024-08-30 DIAGNOSIS — Z12.31 ENCOUNTER FOR SCREENING MAMMOGRAM FOR MALIGNANT NEOPLASM OF BREAST: ICD-10-CM

## 2024-08-30 DIAGNOSIS — E66.3 OVERWEIGHT WITH BODY MASS INDEX (BMI) OF 27 TO 27.9 IN ADULT: ICD-10-CM

## 2024-08-30 PROCEDURE — 99214 OFFICE O/P EST MOD 30 MIN: CPT | Performed by: NURSE PRACTITIONER

## 2024-08-30 RX ORDER — TOPIRAMATE 50 MG/1
50 TABLET, FILM COATED ORAL 2 TIMES DAILY
Qty: 180 TABLET | Refills: 1 | Status: SHIPPED | OUTPATIENT
Start: 2024-08-30

## 2024-08-30 RX ORDER — BUSPIRONE HYDROCHLORIDE 10 MG/1
10 TABLET ORAL 3 TIMES DAILY PRN
Qty: 90 TABLET | Refills: 2 | Status: SHIPPED | OUTPATIENT
Start: 2024-08-30

## 2024-08-30 RX ORDER — CITALOPRAM HYDROBROMIDE 20 MG/1
20 TABLET ORAL DAILY
Qty: 90 TABLET | Refills: 1 | Status: SHIPPED | OUTPATIENT
Start: 2024-08-30

## 2024-08-30 NOTE — PROGRESS NOTES
"Chief Complaint  Anxiety and Depression    Subjective    History of Present Illness      Patient presents to [facility] for   History of Present Illness  Anxiety and depression follow up. Pt states doing well. Anxiety and depression stable. Says she has been out of topamax and wants to restart to control migraines.        Review of Systems    I have reviewed and agree with the HPI and ROS information as above.  Elizabet Ramirez Babs, APRN     Objective   Vital Signs:   /84   Pulse 96   Resp 20   Ht 165.1 cm (65\")   Wt 74.8 kg (165 lb)   BMI 27.46 kg/m²            Physical Exam  Constitutional:       Appearance: Normal appearance. She is well-developed.   HENT:      Head: Normocephalic and atraumatic.      Right Ear: Tympanic membrane, ear canal and external ear normal.      Left Ear: Tympanic membrane, ear canal and external ear normal.      Nose: Nose normal. No septal deviation, nasal tenderness or congestion.      Mouth/Throat:      Lips: Pink. No lesions.      Mouth: Mucous membranes are moist. No oral lesions.      Dentition: Normal dentition.      Pharynx: Oropharynx is clear. No pharyngeal swelling, oropharyngeal exudate or posterior oropharyngeal erythema.   Eyes:      General: Lids are normal. Vision grossly intact. No scleral icterus.        Right eye: No discharge.         Left eye: No discharge.      Extraocular Movements: Extraocular movements intact.      Conjunctiva/sclera: Conjunctivae normal.      Right eye: Right conjunctiva is not injected.      Left eye: Left conjunctiva is not injected.      Pupils: Pupils are equal, round, and reactive to light.   Neck:      Thyroid: No thyroid mass.      Trachea: Trachea normal.   Cardiovascular:      Rate and Rhythm: Normal rate and regular rhythm.      Heart sounds: Normal heart sounds. No murmur heard.     No gallop.   Pulmonary:      Effort: Pulmonary effort is normal.      Breath sounds: Normal breath sounds and air " entry. No wheezing, rhonchi or rales.   Abdominal:      General: There is no distension.      Palpations: Abdomen is soft. There is no mass.      Tenderness: There is no abdominal tenderness. There is no right CVA tenderness, left CVA tenderness, guarding or rebound.   Musculoskeletal:         General: No tenderness or deformity. Normal range of motion.      Cervical back: Full passive range of motion without pain, normal range of motion and neck supple.      Thoracic back: Normal.      Right lower leg: No edema.      Left lower leg: No edema.   Skin:     General: Skin is warm and dry.      Coloration: Skin is not jaundiced.      Findings: No rash.   Neurological:      Mental Status: She is alert and oriented to person, place, and time.      Sensory: Sensation is intact.      Motor: Motor function is intact.      Coordination: Coordination is intact.      Gait: Gait is intact.      Deep Tendon Reflexes: Reflexes are normal and symmetric.   Psychiatric:         Mood and Affect: Mood and affect normal.         Judgment: Judgment normal.            PHQ-2 Depression Screening  Little interest or pleasure in doing things? 0-->not at all   Feeling down, depressed, or hopeless? 0-->not at all   PHQ-2 Total Score 0     PHQ-9 Depression Screening  Little interest or pleasure in doing things? 0-->not at all   Feeling down, depressed, or hopeless? 0-->not at all   Trouble falling or staying asleep, or sleeping too much?     Feeling tired or having little energy?     Poor appetite or overeating?     Feeling bad about yourself - or that you are a failure or have let yourself or your family down?     Trouble concentrating on things, such as reading the newspaper or watching television?     Moving or speaking so slowly that other people could have noticed? Or the opposite - being so fidgety or restless that you have been moving around a lot more than usual?     Thoughts that you would be better off dead, or of hurting yourself in  some way?     PHQ-9 Total Score 0   If you checked off any problems, how difficult have these problems made it for you to do your work, take care of things at home, or get along with other people?        Result Review  Data Reviewed:                   Assessment and Plan      Diagnoses and all orders for this visit:    1. Mixed anxiety and depressive disorder (Primary)  Comments:  Stable on Celexa and buspar. Continue same.  Orders:  -     citalopram (CeleXA) 20 MG tablet; Take 1 tablet by mouth Daily.  Dispense: 90 tablet; Refill: 1  -     busPIRone (BUSPAR) 10 MG tablet; Take 1 tablet by mouth 3 (Three) Times a Day As Needed (anxiety).  Dispense: 90 tablet; Refill: 2    2. Other migraine without status migrainosus, not intractable  Comments:  Does well on topamax, out x 2 weeks. Will restart and cont same.  Orders:  -     topiramate (Topamax) 50 MG tablet; Take 1 tablet by mouth 2 (Two) Times a Day.  Dispense: 180 tablet; Refill: 1    3. Encounter for screening mammogram for malignant neoplasm of breast  Comments:  Mammogram ordered.  Orders:  -     Mammo Screening Digital Tomosynthesis Bilateral With CAD; Future    4. Overweight with body mass index (BMI) of 27 to 27.9 in adult            Follow Up   Return in about 6 months (around 2/28/2025).  Patient was given instructions and counseling regarding her condition or for health maintenance advice. Please see specific information pulled into the AVS if appropriate.

## 2024-08-31 DIAGNOSIS — G43.809 OTHER MIGRAINE WITHOUT STATUS MIGRAINOSUS, NOT INTRACTABLE: ICD-10-CM

## 2024-09-03 RX ORDER — TOPIRAMATE 50 MG/1
50 TABLET, FILM COATED ORAL 2 TIMES DAILY
Qty: 180 TABLET | Refills: 1 | OUTPATIENT
Start: 2024-09-03

## 2024-09-13 ENCOUNTER — OFFICE VISIT (OUTPATIENT)
Dept: FAMILY MEDICINE CLINIC | Facility: CLINIC | Age: 49
End: 2024-09-13
Payer: COMMERCIAL

## 2024-09-13 VITALS
DIASTOLIC BLOOD PRESSURE: 87 MMHG | OXYGEN SATURATION: 99 % | WEIGHT: 170 LBS | SYSTOLIC BLOOD PRESSURE: 136 MMHG | HEART RATE: 90 BPM | BODY MASS INDEX: 28.32 KG/M2 | HEIGHT: 65 IN

## 2024-09-13 DIAGNOSIS — T78.40XD ALLERGY, SUBSEQUENT ENCOUNTER: ICD-10-CM

## 2024-09-13 DIAGNOSIS — R42 VERTIGO: ICD-10-CM

## 2024-09-13 DIAGNOSIS — G43.809 OTHER MIGRAINE WITHOUT STATUS MIGRAINOSUS, NOT INTRACTABLE: ICD-10-CM

## 2024-09-13 DIAGNOSIS — H81.01 MENIERE'S DISEASE OF RIGHT EAR: Primary | ICD-10-CM

## 2024-09-13 DIAGNOSIS — F41.8 MIXED ANXIETY AND DEPRESSIVE DISORDER: ICD-10-CM

## 2024-09-13 RX ORDER — RIZATRIPTAN BENZOATE 10 MG/1
10 TABLET ORAL ONCE AS NEEDED
Qty: 9 TABLET | Refills: 2 | Status: SHIPPED | OUTPATIENT
Start: 2024-09-13

## 2024-09-13 RX ORDER — CITALOPRAM HYDROBROMIDE 20 MG/1
20 TABLET ORAL DAILY
Qty: 90 TABLET | Refills: 1 | Status: SHIPPED | OUTPATIENT
Start: 2024-09-13

## 2024-09-13 RX ORDER — TOPIRAMATE 50 MG/1
50 TABLET, FILM COATED ORAL 2 TIMES DAILY
Qty: 180 TABLET | Refills: 1 | Status: SHIPPED | OUTPATIENT
Start: 2024-09-13

## 2024-09-13 RX ORDER — METHYLPREDNISOLONE 4 MG
TABLET, DOSE PACK ORAL
Qty: 1 EACH | Refills: 0 | Status: SHIPPED | OUTPATIENT
Start: 2024-09-13

## 2024-09-13 RX ORDER — MONTELUKAST SODIUM 10 MG/1
10 TABLET ORAL NIGHTLY
Qty: 30 TABLET | Refills: 5 | Status: SHIPPED | OUTPATIENT
Start: 2024-09-13

## 2024-09-13 RX ORDER — ONDANSETRON 8 MG/1
8 TABLET, ORALLY DISINTEGRATING ORAL EVERY 8 HOURS PRN
Qty: 9 TABLET | Refills: 2 | Status: SHIPPED | OUTPATIENT
Start: 2024-09-13

## 2024-09-13 RX ORDER — BUSPIRONE HYDROCHLORIDE 10 MG/1
10 TABLET ORAL 3 TIMES DAILY PRN
Qty: 90 TABLET | Refills: 2 | Status: SHIPPED | OUTPATIENT
Start: 2024-09-13

## 2024-09-13 RX ORDER — MECLIZINE HCL 12.5 MG 12.5 MG/1
12.5 TABLET ORAL 3 TIMES DAILY PRN
Qty: 90 TABLET | Refills: 0 | Status: SHIPPED | OUTPATIENT
Start: 2024-09-13

## 2024-09-13 NOTE — PROGRESS NOTES
"Chief Complaint  Dizziness    Subjective    History of Present Illness      Patient presents to [facility] for   History of Present Illness  Pt presents today for dizziness. States last couple months its been every other day       Review of Systems    I have reviewed and agree with the HPI information as above.  Yumiko Mcnair, APRN     Objective   Vital Signs:   /87   Pulse 90   Ht 165.1 cm (65\")   Wt 77.1 kg (170 lb)   SpO2 99%   BMI 28.29 kg/m²            Physical Exam  Vitals and nursing note reviewed.   Constitutional:       Appearance: Normal appearance. She is well-developed.   HENT:      Head: Normocephalic and atraumatic.      Right Ear: Tympanic membrane, ear canal and external ear normal.      Left Ear: Tympanic membrane, ear canal and external ear normal.      Nose: Nose normal. No septal deviation, nasal tenderness or congestion.      Mouth/Throat:      Lips: Pink. No lesions.      Mouth: Mucous membranes are moist. No oral lesions.      Dentition: Normal dentition.      Pharynx: Oropharynx is clear. No pharyngeal swelling, oropharyngeal exudate or posterior oropharyngeal erythema.   Eyes:      General: Lids are normal. Vision grossly intact. No scleral icterus.        Right eye: No discharge.         Left eye: No discharge.      Extraocular Movements: Extraocular movements intact.      Conjunctiva/sclera: Conjunctivae normal.      Right eye: Right conjunctiva is not injected.      Left eye: Left conjunctiva is not injected.      Pupils: Pupils are equal, round, and reactive to light.   Neck:      Thyroid: No thyroid mass.      Trachea: Trachea normal.   Cardiovascular:      Rate and Rhythm: Normal rate and regular rhythm.      Heart sounds: Normal heart sounds. No murmur heard.     No gallop.   Pulmonary:      Effort: Pulmonary effort is normal.      Breath sounds: Normal breath sounds and air entry. No wheezing, rhonchi or rales.   Musculoskeletal:       "   General: No tenderness or deformity. Normal range of motion.      Cervical back: Full passive range of motion without pain, normal range of motion and neck supple.      Thoracic back: Normal.      Right lower leg: No edema.      Left lower leg: No edema.   Skin:     General: Skin is warm and dry.      Coloration: Skin is not jaundiced.      Findings: No rash.   Neurological:      Mental Status: She is alert and oriented to person, place, and time.      Sensory: Sensation is intact.      Motor: Motor function is intact.      Coordination: Coordination is intact.      Gait: Gait is intact.      Deep Tendon Reflexes: Reflexes are normal and symmetric.   Psychiatric:         Mood and Affect: Mood and affect normal.         Behavior: Behavior normal.         Judgment: Judgment normal.               Result Review  Data Reviewed:                   Assessment and Plan      Diagnoses and all orders for this visit:    1. Meniere's disease of right ear (Primary)  -     methylPREDNISolone (MEDROL) 4 MG dose pack; Take as directed on package instructions.  Dispense: 1 each; Refill: 0  -     ondansetron ODT (ZOFRAN-ODT) 8 MG disintegrating tablet; Place 1 tablet on the tongue Every 8 (Eight) Hours As Needed for Nausea or Vomiting.  Dispense: 9 tablet; Refill: 2    2. Mixed anxiety and depressive disorder  Comments:  Stable on Celexa and buspar. Continue same.  Orders:  -     busPIRone (BUSPAR) 10 MG tablet; Take 1 tablet by mouth 3 (Three) Times a Day As Needed (anxiety).  Dispense: 90 tablet; Refill: 2  -     citalopram (CeleXA) 20 MG tablet; Take 1 tablet by mouth Daily.  Dispense: 90 tablet; Refill: 1    3. Vertigo  -     meclizine (ANTIVERT) 12.5 MG tablet; Take 1 tablet by mouth 3 (Three) Times a Day As Needed for Dizziness.  Dispense: 90 tablet; Refill: 0    4. Allergy, subsequent encounter  Comments:  Refill and cont singulair.  Orders:  -     montelukast (Singulair) 10 MG tablet; Take 1 tablet by mouth Every Night.   Dispense: 30 tablet; Refill: 5    5. Other migraine without status migrainosus, not intractable  -     rizatriptan (Maxalt) 10 MG tablet; Take 1 tablet by mouth 1 (One) Time As Needed for Migraine. May repeat in 2 hours if needed  Dispense: 9 tablet; Refill: 2  -     topiramate (Topamax) 50 MG tablet; Take 1 tablet by mouth 2 (Two) Times a Day.  Dispense: 180 tablet; Refill: 1    6. Other migraine without status migrainosus, not intractable  Comments:  Does well on topamax, out x 2 weeks. Will restart and cont same.  Orders:  -     rizatriptan (Maxalt) 10 MG tablet; Take 1 tablet by mouth 1 (One) Time As Needed for Migraine. May repeat in 2 hours if needed  Dispense: 9 tablet; Refill: 2  -     topiramate (Topamax) 50 MG tablet; Take 1 tablet by mouth 2 (Two) Times a Day.  Dispense: 180 tablet; Refill: 1    Patient is having a flare of her Ménière's disease.  She has been doing her typical at home treatment it is not getting any better.  She is currently taking Allegra with Sudafed.  Will add on a steroid pack at this time along with some nausea medicine.  Patient is needing all her chronic medications sent to [facility] today also.        Follow Up   Return in about 6 months (around 3/13/2025) for Recheck.  Patient was given instructions and counseling regarding her condition or for health maintenance advice. Please see specific information pulled into the AVS if appropriate.

## 2024-10-01 LAB — NCCN CRITERIA FLAG: NORMAL

## 2024-10-04 ENCOUNTER — HOSPITAL ENCOUNTER (OUTPATIENT)
Dept: MAMMOGRAPHY | Facility: HOSPITAL | Age: 49
Discharge: HOME | End: 2024-10-04
Admitting: NURSE PRACTITIONER
Payer: COMMERCIAL

## 2024-10-04 DIAGNOSIS — Z12.31 ENCOUNTER FOR SCREENING MAMMOGRAM FOR MALIGNANT NEOPLASM OF BREAST: ICD-10-CM

## 2024-10-04 PROCEDURE — 77067 SCR MAMMO BI INCL CAD: CPT

## 2024-10-04 PROCEDURE — 77063 BREAST TOMOSYNTHESIS BI: CPT

## 2024-10-08 ENCOUNTER — TELEPHONE (OUTPATIENT)
Dept: FAMILY MEDICINE CLINIC | Facility: CLINIC | Age: 49
End: 2024-10-08
Payer: COMMERCIAL

## 2024-10-08 NOTE — TELEPHONE ENCOUNTER
----- Message from Elizabet Brice sent at 10/8/2024  7:58 AM CDT -----  Please let pt know results: no evidence of malignancy, repeat in 1 year.

## 2024-10-17 ENCOUNTER — E-VISIT (OUTPATIENT)
Dept: FAMILY MEDICINE CLINIC | Facility: TELEHEALTH | Age: 49
End: 2024-10-17
Payer: COMMERCIAL

## 2024-10-17 PROCEDURE — FABRICHEALTHVISIT: Performed by: NURSE PRACTITIONER

## 2024-10-17 RX ORDER — METHYLPREDNISOLONE 4 MG
TABLET, DOSE PACK ORAL
Qty: 21 TABLET | Refills: 0 | Status: SHIPPED | OUTPATIENT
Start: 2024-10-17

## 2024-10-17 NOTE — E-VISIT TREATED
Date: 10/17/2024 15:52:40  Clinician: Lana Woods  Clinician NPI: [id number]  Patient: Aysha Barros  Patient : 1975  Patient Address: [address] [place] [address]  Patient Phone: [phone]  Visit Protocol: URI  Patient Summary:  Aysha is a 48 year old ( : 1975 ) female who initiated a visit for cold, sinus infection, or influenza.     Aysha states the symptoms started suddenly 3-5 days ago.   Symptom start date: 10/12/2024   The symptoms consist of a   cough, chills, malaise, anosmia, a sore throat, facial pain or pressure, rhinitis, a headache, myalgia, and nasal congestion. Aysha also feels feverish but was unable to measure temperature.   Symptom details     Nasal secretions: The color of the mucus is yellow and green.    Cough: Aysha coughs almost every minute and the cough is more bothersome at night. Phlegm comes into the throat when coughing. Aysha believes the cough is caused by post-nasal drip. The color of the phlegm is blood-tinged, yellow, and green.     Sore throat: Aysha reports having mild throat pain (1-3 on a 10 point pain scale), does not have exudate on the tonsils, and can swallow liquids with mild discomfort. The lymph nodes in the neck are not enlarged. A rash has not appeared on the skin   since the sore throat started.     Facial pain or pressure: The facial pain or pressure feels worse when bending over or leaning forward.     Headache: The headache is mild (1-3 on a 10 point pain scale).      Aysha denies having diarrhea, ageusia, wheezing, ear pain, vomiting, enlarged lymph nodes, nausea, and teeth pain. Aysha also denies having a sinus infection within the past year, having recent facial or sinus surgery in the past 60 days, taking   antibiotic medication in the past month, and double sickening (worsening symptoms after initial improvement). Aysha is not experiencing dyspnea.   Precipitating events  Aysha is not sure if they have been exposed to  someone with strep throat. Aysha has   not recently been exposed to someone with influenza. Aysha has not been in close contact with any high risk individuals.    Aysha has not received the influenza vaccination.   Pertinent COVID-19 (Coronavirus) information  Since the symptoms started,   Aysha has not tested for COVID-19.   Aysha has not had COVID-19 in the last 3 months.   Aysha has not received a COVID-19 vaccine in the past year.   Pertinent medical history     A provider has not told Aysha to avoid NSAIDs.   Aysha does not get yeast   infections when an antibiotic is taken.   Aysha does not have diabetes. Aysha denies having immunosuppressive conditions (e.g., chemotherapy, HIV, organ transplant, long-term use of steroids or other immunosuppressive medications, splenectomy). Aysha   denies having severe COPD, heart disease, and congestive heart failure. Aysha does not have asthma.   Aysha denies having chronic lung disease, cystic fibrosis, hypertension, long-term disabilities, mental health conditions, sickle cell disease or   thalassemia, stroke or other cardiovascular disease, substance use disorders, or tuberculosis (TB).  Aysha does not smoke or use smokeless tobacco. Aysha does not vape or use other e-cigarette products.   Aysha denies pregnancy and denies breastfeeding.     Weight: 162 lbs (73.48 kg)    MEDICATIONS: methylprednisolone oral, rizatriptan oral, fluticasone propionate nasal, clindamycin HCl oral, ondansetron oral, meclizine oral, buspirone oral, dexamethasone sodium phosphate injection, dexamethasone sodium phosphate injection,   dexamethasone sodium phosphate injection, dexamethasone sodium phosphate injection, ceftriaxone injection, topiramate oral, montelukast oral, midazolam injection, ALLERGIES: cefaclor  Clinician Response:  Dear Aysha,  Based on the information provided, you have acute bacterial sinusitis, also known as a sinus infection. Sinus infections are caused by bacteria or a  virus and symptoms are almost always identical. The difference between   the 2 types of infections is timing.  Sinus infections start as viral infections and symptoms improve on their own in about 7 days. A bacterial infection may have developed if any of the following apply to you:     You have had 7 days of symptoms and are experiencing at least 2 of the following:       Fever    Facial pressure or headache    Green or yellow nasal mucus    Symptoms that improved and then got worse again       You have had symptoms for 10 or more days     Medication information  I am prescribing:       Fluticasone 50 mcg/actuation nasal spray. Inhale 2 sprays in each nostril 1 time per day; after 1 week, may adjust to 1 - 2 sprays in each nostril 1 time per day. This medication takes several days to start working, so keep taking it even if it doesn't   help right away. There are no refills with this prescription.      Doxycycline hyclate 100 mg oral tablet. Take 1 tablet by mouth 2 times per day for 7 days. There are no refills with this prescription.     Certain antibiotics, such as doxycycline, levofloxacin, and moxifloxacin, can cause your skin to be more sensitive to the sun. Exposure to the sun when taking these antibiotics may cause a skin rash, itching, redness in lighter skin tones,   discoloration in darker skin tones, or a severe sunburn. Be sure to avoid direct exposure to the sun, even for short periods of time, especially between 10 am and 3 pm if possible. Protective clothing from the sun and a broad-spectrum sunscreen is   recommended for all skin types when outdoors.   We recommend a water-resistant, tinted, mineral, broad-spectrum sunscreen, SPF 30 or higher, that contains iron oxide or titanium dioxide. Reapply every 2 hours. Sunscreens that also contain antioxidants   can reduce your risk of sun damage and will enhance the sunscreen's effects providing additional protection for your skin. For more information,  please visit the following link:  Sunscreen education  Yeast infections can be a common side effect of   antibiotics. The most common symptom of a yeast infection is itchiness in and around the vagina. Other signs and symptoms include burning, redness of the vulva (the outer part of the female genitals), or a thick, white vaginal discharge that looks like   cottage cheese and does not have a bad smell.  If you become pregnant during this course of treatment, stop taking the medication and contact your primary care provider.  Self care  Steps you can take to be as comfortable as possible:     Rest.    Drink plenty of fluids.    Take a warm shower to loosen congestion    Use a cool-mist humidifier.    Use throat lozenges.    Suck on frozen items such as popsicles.    Drink hot tea with lemon and honey.    Gargle with warm salt water (1/4 teaspoon of salt per 8 ounce glass of water).    Take a spoonful of honey to reduce your cough.     When to seek care  Please be seen in a clinic or urgent care if any of the following occur:     New symptoms develop, or symptoms become worse    Symptoms do not start to improve after 3 days of treatment     Call 911 or go to the emergency room if any of the following occur:     Difficulty breathing    If you feel that your throat is closing off    Suddenly develop a rash    Unable to swallow fluids or are drooling     It is possible to have an allergic reaction to an antibiotic even if you have not had one in the past. If you notice a new rash, significant swelling, or difficulty breathing, stop taking this medication immediately and go to a clinic or urgent care.    For the latest updates on COVID-19 (Coronavirus), please visit the Centers for Disease Control and Prevention (CDC). Also, your state and local health department websites may provide additional guidance regarding testing and isolation recommendations for   your location.   Diagnosis: Acute bacterial sinusitis  Diagnosis  ICD: J01.90    Follow up instructions: ATTENTION: If you have been prescribed medications, your prescriptions will not be sent until you choose your pharmacy.  To do so open the link within your notification, or go to [employer] and click eVisit in the menu to open your   treatment plan. From there, you can select your pharmacy at the bottom of your after visit summary. You can also go to [email or web address]  Prescriptions  Prescription: doxycycline hyclate 100 mg oral tablet, take 1 tablet by mouth 2 times per day for 7 days  Sent To: [facility]/pharmacy #[id number] - [phone] - [address] [place] [address]  Prescription: fluticasone 50 mcg/actuation nasal spray,suspension, inhale 2 sprays in each nostril 1 time per day; after 1 week, may adjust to 1 - 2 sprays in each nostril 1 time per day.  Sent To: [facility]/pharmacy #[id number] - [id number] - [address] [place] [address]

## 2024-11-08 ENCOUNTER — E-VISIT (OUTPATIENT)
Dept: FAMILY MEDICINE CLINIC | Facility: TELEHEALTH | Age: 49
End: 2024-11-08
Payer: COMMERCIAL

## 2024-11-08 NOTE — E-VISIT ESCALATED
Date: 2024 12:08:40  Clinician: Aysha Griffin  Clinician NPI: [id number]  Patient: Aysha Barros  Patient : 1975  Patient Address: [address] [place] [address]  Patient Phone: [phone]  Visit Protocol: URI  Patient Summary:  Aysha is a 49 year old ( : 1975 ) female who initiated a visit for cold, sinus infection, or influenza.     Aysha states the symptoms started suddenly 3-5 days ago.   Symptom start date:    The symptoms consist of   a headache, myalgia, nasal congestion, a cough, facial pain or pressure, malaise, a sore throat, rhinitis, and enlarged lymph nodes. Aysha also feels feverish but was unable to measure temperature.   Symptom details     Nasal secretions: The color of the mucus is green.    Cough: Aysha coughs every 5-10 minutes and the cough is more bothersome at night. Phlegm comes into the throat when coughing. Aysha believes the cough is caused by post-nasal drip. The color of the phlegm is yellow.     Sore throat: Aysha reports having moderate throat pain (4-6 on a 10 point pain scale), does not have exudate on the tonsils, and can swallow liquids but it is very painful. The lymph nodes in the neck are enlarged. The lymph node swelling is not worse   on one side and the swelling has caused changes in speech or hoarseness in the voice. A rash has not appeared on the skin since the sore throat started.     Facial pain or pressure: The facial pain or pressure does not feel worse when bending or leaning forward.     Headache: The headache is moderate (4-6 on a 10 point pain scale).      Aysha denies having nausea, teeth pain, ageusia, wheezing, anosmia, chills, ear pain, vomiting, and diarrhea. Aysha also denies double sickening (worsening symptoms after initial improvement) and having recent facial or sinus surgery in the past 60   days. Aysha is not experiencing dyspnea.   Precipitating events  Aysha is not sure if they have been exposed to someone  with strep throat. Aysha has not recently been exposed to someone with influenza. Aysha has not been in close contact with any high   risk individuals.    Aysha has not received the influenza vaccination.   Pertinent COVID-19 (Coronavirus) information  Since the symptoms started, Aysha has not tested for COVID-19.   Aysha has not had COVID-19 in the last 3 months.   Aysha has not   received a COVID-19 vaccine in the past year.   Pertinent medical history     Aysha had 3 sinus infections within the past year.   Aysha has taken an antibiotic medication for similar symptoms in the past month. Antibiotic name as reported by the patient   (free text): Can't remember name   A provider has not told Aysha to avoid NSAIDs.   Aysha does not get yeast infections when an antibiotic is taken.   Aysha does not have diabetes. Aysha denies having immunosuppressive conditions (e.g., chemotherapy,   HIV, organ transplant, long-term use of steroids or other immunosuppressive medications, splenectomy). Aysha denies having severe COPD, heart disease, and congestive heart failure. Aysha does not have asthma.   Aysha denies having chronic lung disease,   cystic fibrosis, hypertension, long-term disabilities, mental health conditions, sickle cell disease or thalassemia, stroke or other cardiovascular disease, substance use disorders, or tuberculosis (TB).  Aysha does not smoke or use smokeless tobacco.   Aysha does not vape or use other e-cigarette products.   Aysha denies pregnancy and denies breastfeeding.   Weight: 160 lbs (72.57 kg)    MEDICATIONS: methylprednisolone oral, midazolam injection, montelukast oral, rizatriptan oral, fluticasone propionate nasal, clindamycin HCl oral, ondansetron oral, meclizine oral, buspirone oral, dexamethasone sodium phosphate injection, dexamethasone   sodium phosphate injection, dexamethasone sodium phosphate injection, dexamethasone sodium phosphate injection, ceftriaxone injection, topiramate oral,  ALLERGIES: cefaclor  Clinician Response:  Dear Aysha,  I am sorry you are not feeling well. To determine the most appropriate care for you, I would like you to be seen in person to further discuss your health history and symptoms.  You will not be charged for this visit.   Thank you for trusting us with your care.  For the latest updates on COVID-19 (Coronavirus), please visit the Centers for Disease Control and Prevention (CDC). Also, your state and local health department websites may provide additional guidance   regarding testing and isolation recommendations for your location.   Diagnosis: Refer for additional evaluation  Diagnosis ICD: R69  Additional Clinician Notes:  May do video visit

## 2024-11-22 ENCOUNTER — TELEPHONE (OUTPATIENT)
Dept: GENETICS | Facility: HOSPITAL | Age: 49
End: 2024-11-22
Payer: COMMERCIAL

## 2024-11-22 NOTE — TELEPHONE ENCOUNTER
On 11/21/24, I attempted to contact the patient regarding her risk assessment that she recently completed for a mammogram appointment.  (to review personal information and make changes if needed).  I was unable to speak to her and left a voicemail and sent a [email or web address] message.

## 2025-02-10 ENCOUNTER — E-VISIT (OUTPATIENT)
Dept: FAMILY MEDICINE CLINIC | Facility: TELEHEALTH | Age: 50
End: 2025-02-10
Payer: COMMERCIAL

## 2025-02-10 PROCEDURE — FABRICHEALTHVISIT: Performed by: NURSE PRACTITIONER

## 2025-02-10 NOTE — E-VISIT TREATED
Date: 02/10/2025 14:42:17  Clinician: Genna Case  Clinician NPI: [id number]  Patient: Aysha Barros  Patient : 1975  Patient Address: [address] [place] [address]  Patient Phone: [phone]  Visit Protocol: URI  Patient Summary:  Aysha is a 49 year old ( : 1975 ) female who initiated a visit for cold, sinus infection, or influenza.     Aysha states the symptoms started gradually 3-5 days ago.   Symptom start date: 2025   The symptoms consist of a   headache, rhinitis, facial pain or pressure, malaise, nasal congestion, chills, and a cough. Aysha also feels feverish.   Symptom details     Nasal secretions: The color of the mucus is green.    Cough: Aysha coughs almost every minute and the cough is more bothersome at night. Phlegm comes into the throat when coughing. Aysha believes the cough is caused by post-nasal drip. The color of the phlegm is green.     Temperature: Current temperature is 100.3 degrees Fahrenheit. Aysha has had a temperature over 100 degrees Fahrenheit for 1-2 days.     Facial pain or pressure: The facial pain or pressure feels worse when bending over or leaning forward.     Headache: The headache is mild (1-3 on a 10 point pain scale).      Aysha denies having wheezing, ear pain, vomiting, anosmia and ageusia, nausea, diarrhea, teeth pain, sore throat, and myalgias. Aysha also denies double sickening (worsening symptoms after initial improvement), taking antibiotic medication in the past   month, having a sinus infection within the past year, and having recent facial or sinus surgery in the past 60 days. Aysha is not experiencing dyspnea.   Precipitating events  Aysha has not recently been exposed to someone with influenza. Aysha has not   been in close contact with any high risk individuals.    Aysha has not received the influenza vaccination.   Pertinent COVID-19 (Coronavirus) information  Since the symptoms started, Aysha has not tested for COVID-19.  Aysha was not able to re-test today.     Aysha does not need a COVID-19 test.   Aysha has not received a COVID-19 vaccine in the past year.   Pertinent medical history     A provider has not told Aysha to avoid NSAIDs.   Aysha does not get yeast infections when an antibiotic is taken.     Aysha does not have diabetes. Aysha denies having immunosuppressive conditions (e.g., chemotherapy, HIV, organ transplant, long-term use of steroids or other immunosuppressive medications, splenectomy). Aysha denies having congestive heart failure, heart   disease, and severe COPD. Aysha does not have asthma.   Aysha denies having chronic lung disease, cystic fibrosis, hypertension, long-term disabilities, mental health conditions, sickle cell disease or thalassemia, stroke or other cardiovascular   disease, substance use disorders, or tuberculosis (TB).  Aysha does not smoke or use smokeless tobacco. Aysha does not vape or use other e-cigarette products.   Aysha denies pregnancy and denies breastfeeding.   Additional information as reported by the   patient (free text): I have fluid in both ears, causing vertigo. If possible, could I please have a steroid pack that usually clears it up. I would have done an in person visit but I have not been able to drive for the past two days. Thank you   Weight:   159 lbs (72.12 kg)    MEDICATIONS: methylprednisolone oral, montelukast oral, rizatriptan oral, fluticasone propionate nasal, ondansetron oral, meclizine oral, buspirone oral, topiramate oral, ALLERGIES: cefaclor  Clinician Response:  Dear Aysha,  Based on the information provided, you have a viral upper respiratory infection, otherwise known as a cold. Symptoms vary from person to person, but can include sneezing, coughing, a runny nose, sore throat, and headache   and range from mild to severe.  Unfortunately, there are no medications that can cure a cold, so treatment is focused on controlling symptoms as much as possible. Most  people gradually feel better until symptoms are gone in 1-2 weeks.  Medication   information  Because you have a viral infection, antibiotics will not help you get better. Treating a viral infection with antibiotics could actually make you feel worse.  I am prescribing:       Fluticasone 50 mcg/actuation nasal spray. Spray twice in each nostril 1 time per day; after 1 week, may adjust to 1 - 2 sprays in each nostril 1 time per day. This medication takes several days to start working, so keep taking it even if it doesn't help   right away. There are no refills with this prescription.      Brompheniramine-pseudoeph-DM (Bromfed DM) 2-30-10 mg/5mL oral syrup. Take 10 milliliters by mouth every 4 hours as needed for cough. Do not take more than 60mL in 24 hours. There are no refills with this prescription.     Tips for using a nasal spray:     When the medication is being sprayed in your nose, point the tip of the nasal spray towards your ear.    Do not blow your nose after using the spray.    Do not lean your head back after using the spray as it will go down your throat.    Wipe the tip of the nose piece after use with a dry, clean tissue.     Self care  Steps you can take to be as comfortable as possible:     Rest.    Drink plenty of fluids.    Take a warm shower to loosen congestion.    Use a cool-mist humidifier.    Take a spoonful of honey to reduce your cough.     When to seek care  Please be seen in a clinic or urgent care if any of the following occur:   New symptoms develop, or symptoms become worse   Call 911 or go to the emergency room if any of the following occur:     If you feel that your throat is closing off    Suddenly develop a rash    Unable to swallow fluids or are drooling     For the latest updates on COVID-19 (Coronavirus), please visit the Centers for Disease Control and Prevention (CDC). Also, your state and local health department websites may provide additional guidance regarding testing and  isolation recommendations   for your location.   Diagnosis: Viral URI  Diagnosis ICD: J06.9    Follow up instructions: ATTENTION: If you have been prescribed medications, your prescriptions will not be sent until you choose your pharmacy.  To do so open the link within your notification, or go to [employer] and click eVisit in the menu to open your   treatment plan. From there, you can select your pharmacy at the bottom of your after visit summary. You can also go to [email or web address]  Prescriptions  Prescription: fluticasone 50 mcg/actuation nasal spray,suspension, spray twice in each nostril 1 time per day; after 1 week, may adjust to 1 - 2 sprays in each nostril 1 time per day.  Sent To: [facility] #[id number] - [id number] - [address] [place] [address]  Prescription: prednisone 10 mg oral tablets,dose pack, take tablets per pack instructions  Sent To: [facility]/pharm[facility] #[id number] - [id number] - [address] [place] [address]  Prescription: brompheniramine-pseudoeph-DM (Bromfed DM) 2-30-10 mg/5 mL oral syrup, take 10 milliliters by mouth every 4 hours as needed for cough  Sent To: [facility]/pharm[facility] #[id number] - [id number] - [address] [place] [address]

## 2025-02-28 ENCOUNTER — LAB (OUTPATIENT)
Dept: LAB | Facility: HOSPITAL | Age: 50
End: 2025-02-28
Payer: COMMERCIAL

## 2025-02-28 ENCOUNTER — OFFICE VISIT (OUTPATIENT)
Dept: FAMILY MEDICINE CLINIC | Facility: CLINIC | Age: 50
End: 2025-02-28
Payer: COMMERCIAL

## 2025-02-28 VITALS
WEIGHT: 163 LBS | BODY MASS INDEX: 27.16 KG/M2 | DIASTOLIC BLOOD PRESSURE: 85 MMHG | HEART RATE: 102 BPM | HEIGHT: 65 IN | SYSTOLIC BLOOD PRESSURE: 127 MMHG | OXYGEN SATURATION: 98 %

## 2025-02-28 DIAGNOSIS — Z00.00 WELLNESS EXAMINATION: ICD-10-CM

## 2025-02-28 DIAGNOSIS — R42 VERTIGO: ICD-10-CM

## 2025-02-28 DIAGNOSIS — E66.3 OVERWEIGHT WITH BODY MASS INDEX (BMI) OF 27 TO 27.9 IN ADULT: ICD-10-CM

## 2025-02-28 DIAGNOSIS — Z12.11 SCREENING FOR COLON CANCER: ICD-10-CM

## 2025-02-28 DIAGNOSIS — H81.01 MENIERE'S DISEASE OF RIGHT EAR: ICD-10-CM

## 2025-02-28 DIAGNOSIS — Z00.00 WELLNESS EXAMINATION: Primary | ICD-10-CM

## 2025-02-28 DIAGNOSIS — F41.8 MIXED ANXIETY AND DEPRESSIVE DISORDER: ICD-10-CM

## 2025-02-28 DIAGNOSIS — T78.40XD ALLERGY, SUBSEQUENT ENCOUNTER: ICD-10-CM

## 2025-02-28 DIAGNOSIS — Z12.4 SCREENING FOR CERVICAL CANCER: ICD-10-CM

## 2025-02-28 DIAGNOSIS — G43.809 OTHER MIGRAINE WITHOUT STATUS MIGRAINOSUS, NOT INTRACTABLE: ICD-10-CM

## 2025-02-28 LAB
ALBUMIN SERPL-MCNC: 4.4 G/DL (ref 3.5–5)
ALBUMIN/GLOB SERPL: 1.4 G/DL (ref 1.1–2.5)
ALP SERPL-CCNC: 36 U/L (ref 24–120)
ALT SERPL W P-5'-P-CCNC: 18 U/L (ref 0–35)
ANION GAP SERPL CALCULATED.3IONS-SCNC: 7 MMOL/L (ref 4–13)
AST SERPL-CCNC: 24 U/L (ref 7–45)
AUTO MIXED CELLS #: 0.7 10*3/MM3 (ref 0.1–2.6)
AUTO MIXED CELLS %: 8.2 % (ref 0.1–24)
BACTERIA UR QL AUTO: ABNORMAL /HPF
BILIRUB SERPL-MCNC: 0.4 MG/DL (ref 0.1–1)
BILIRUB UR QL STRIP: NEGATIVE
BUN SERPL-MCNC: 14 MG/DL (ref 5–21)
BUN/CREAT SERPL: 15.6
CALCIUM SPEC-SCNC: 9.2 MG/DL (ref 8.6–10.5)
CHLORIDE SERPL-SCNC: 105 MMOL/L (ref 98–110)
CHOLEST SERPL-MCNC: 199 MG/DL (ref 130–200)
CLARITY UR: CLEAR
CO2 SERPL-SCNC: 25 MMOL/L (ref 24–31)
COLOR UR: YELLOW
CREAT SERPL-MCNC: 0.9 MG/DL (ref 0.5–1.4)
EGFRCR SERPLBLD CKD-EPI 2021: 79.5 ML/MIN/1.73
ERYTHROCYTE [DISTWIDTH] IN BLOOD BY AUTOMATED COUNT: 13.5 % (ref 12.3–15.4)
GLOBULIN UR ELPH-MCNC: 3.1 GM/DL
GLUCOSE SERPL-MCNC: 92 MG/DL (ref 65–99)
GLUCOSE UR STRIP-MCNC: NEGATIVE MG/DL
HBA1C MFR BLD: 5.5 % (ref 4.8–5.9)
HCT VFR BLD AUTO: 41.9 % (ref 34–46.6)
HDLC SERPL-MCNC: 92 MG/DL
HGB BLD-MCNC: 13.4 G/DL (ref 12–15.9)
HGB UR QL STRIP.AUTO: NEGATIVE
HYALINE CASTS UR QL AUTO: ABNORMAL /LPF
KETONES UR QL STRIP: NEGATIVE
LDLC SERPL CALC-MCNC: 94 MG/DL (ref 0–99)
LDLC/HDLC SERPL: 1 {RATIO}
LEUKOCYTE ESTERASE UR QL STRIP.AUTO: ABNORMAL
LYMPHOCYTES # BLD AUTO: 1.4 10*3/MM3 (ref 0.7–3.1)
LYMPHOCYTES NFR BLD AUTO: 15.5 % (ref 19.6–45.3)
MCH RBC QN AUTO: 30.7 PG (ref 26.6–33)
MCHC RBC AUTO-ENTMCNC: 32 G/DL (ref 31.5–35.7)
MCV RBC AUTO: 96.1 FL (ref 79–97)
NEUTROPHILS NFR BLD AUTO: 6.7 10*3/MM3 (ref 1.7–7)
NEUTROPHILS NFR BLD AUTO: 76.3 % (ref 42.7–76)
NITRITE UR QL STRIP: NEGATIVE
PH UR STRIP.AUTO: 8 [PH] (ref 5–8)
PLATELET # BLD AUTO: 286 10*3/MM3 (ref 140–450)
PMV BLD AUTO: 9.7 FL (ref 6–12)
POTASSIUM SERPL-SCNC: 4.5 MMOL/L (ref 3.5–5.3)
PROT SERPL-MCNC: 7.5 G/DL (ref 6.3–8.7)
PROT UR QL STRIP: NEGATIVE
RBC # BLD AUTO: 4.36 10*6/MM3 (ref 3.77–5.28)
RBC # UR STRIP: ABNORMAL /HPF
REF LAB TEST METHOD: ABNORMAL
SODIUM SERPL-SCNC: 137 MMOL/L (ref 135–145)
SP GR UR STRIP: 1.01 (ref 1–1.03)
SQUAMOUS #/AREA URNS HPF: ABNORMAL /HPF
TRIGL SERPL-MCNC: 73 MG/DL (ref 0–149)
UROBILINOGEN UR QL STRIP: ABNORMAL
VLDLC SERPL-MCNC: 13 MG/DL (ref 5–40)
WBC # UR STRIP: ABNORMAL /HPF
WBC NRBC COR # BLD AUTO: 8.8 10*3/MM3 (ref 3.4–10.8)

## 2025-02-28 PROCEDURE — 80050 GENERAL HEALTH PANEL: CPT

## 2025-02-28 PROCEDURE — 81001 URINALYSIS AUTO W/SCOPE: CPT

## 2025-02-28 PROCEDURE — 80061 LIPID PANEL: CPT

## 2025-02-28 PROCEDURE — 87086 URINE CULTURE/COLONY COUNT: CPT

## 2025-02-28 PROCEDURE — 83036 HEMOGLOBIN GLYCOSYLATED A1C: CPT

## 2025-02-28 PROCEDURE — 36415 COLL VENOUS BLD VENIPUNCTURE: CPT

## 2025-02-28 RX ORDER — MECLIZINE HCL 12.5 MG 12.5 MG/1
12.5 TABLET ORAL 3 TIMES DAILY PRN
Qty: 60 TABLET | Refills: 0 | Status: SHIPPED | OUTPATIENT
Start: 2025-02-28

## 2025-02-28 RX ORDER — TOPIRAMATE 50 MG/1
50 TABLET, FILM COATED ORAL 2 TIMES DAILY
Qty: 180 TABLET | Refills: 1 | Status: SHIPPED | OUTPATIENT
Start: 2025-02-28

## 2025-02-28 RX ORDER — BUSPIRONE HYDROCHLORIDE 10 MG/1
10 TABLET ORAL 3 TIMES DAILY PRN
Qty: 90 TABLET | Refills: 2 | Status: SHIPPED | OUTPATIENT
Start: 2025-02-28

## 2025-02-28 RX ORDER — MONTELUKAST SODIUM 10 MG/1
10 TABLET ORAL NIGHTLY
Qty: 90 TABLET | Refills: 1 | Status: SHIPPED | OUTPATIENT
Start: 2025-02-28

## 2025-02-28 RX ORDER — CITALOPRAM HYDROBROMIDE 20 MG/1
20 TABLET ORAL DAILY
Qty: 90 TABLET | Refills: 1 | Status: SHIPPED | OUTPATIENT
Start: 2025-02-28

## 2025-02-28 RX ORDER — MECLIZINE HCL 12.5 MG 12.5 MG/1
12.5 TABLET ORAL 3 TIMES DAILY PRN
Qty: 90 TABLET | Refills: 0 | Status: CANCELLED | OUTPATIENT
Start: 2025-02-28

## 2025-02-28 RX ORDER — RIZATRIPTAN BENZOATE 10 MG/1
10 TABLET ORAL ONCE AS NEEDED
Qty: 9 TABLET | Refills: 2 | Status: SHIPPED | OUTPATIENT
Start: 2025-02-28

## 2025-02-28 RX ORDER — FEXOFENADINE HCL AND PSEUDOEPHEDRINE HCL 180; 240 MG/1; MG/1
1 TABLET, EXTENDED RELEASE ORAL DAILY
Qty: 30 TABLET | Refills: 1 | Status: SHIPPED | OUTPATIENT
Start: 2025-02-28

## 2025-02-28 NOTE — PROGRESS NOTES
"Chief Complaint  Anxiety, Depression, Migraine, and Wellness Check    Subjective    History of Present Illness      Patient presents to [facility] for       History of Present Illness  The patient is a 49-year-old female who presents for her chronic medication follow-up and wellness.     She reports satisfactory management of her maintenance medications, with effective control of her headaches. She continues to take Singulair and Allegra-D, and inquires about the possibility of obtaining a prescription for Allegra-D. Her Celexa regimen appears to be well-tolerated, and she supplements this with additional BuSpar on challenging days. She experienced a vertigo episode a few weeks prior, necessitating a telehealth consultation due to her inability to attend in person. She has been utilizing meclizine 12.5 mg for this issue, which she finds beneficial. She also reports fluid accumulation in her right ear, leading to significant hearing loss. This symptom first manifested when she was approximately 19 or 20 years old, but a definitive diagnosis was not made until her early 40s. She manages her allergies with nasal spray.    She has not undergone colon cancer screening and is not interested in cologuard. She does not regularly consult with gynecology for Pap smears, as her previous provider passed away. She prefers to continue her care at [facility]. She reports no history of gynecological issues.    MEDICATIONS  Singulair, Allegra-D, Celexa, BuSpar, meclizine    Review of Systems    I have reviewed and agree with the HPI and ROS information as above.  Elizabet Brice, APRN     Objective   Vital Signs:   /85   Pulse 102   Ht 165.1 cm (65\")   Wt 73.9 kg (163 lb)   SpO2 98%   BMI 27.12 kg/m²     BMI is >= 25 and <30. (Overweight) The following options were offered after discussion;: information on healthy weight added to patient's after visit summary       Physical Exam  Constitutional:  "      Appearance: She is well-developed and overweight.   HENT:      Head: Normocephalic and atraumatic.      Right Ear: Tympanic membrane, ear canal and external ear normal.      Left Ear: Tympanic membrane, ear canal and external ear normal.      Nose: Nose normal. No septal deviation, nasal tenderness or congestion.      Mouth/Throat:      Lips: Pink. No lesions.      Mouth: Mucous membranes are moist. No oral lesions.      Dentition: Normal dentition.      Pharynx: Oropharynx is clear. No pharyngeal swelling, oropharyngeal exudate or posterior oropharyngeal erythema.   Eyes:      General: Lids are normal. Vision grossly intact. No scleral icterus.        Right eye: No discharge.         Left eye: No discharge.      Extraocular Movements: Extraocular movements intact.      Conjunctiva/sclera: Conjunctivae normal.      Right eye: Right conjunctiva is not injected.      Left eye: Left conjunctiva is not injected.      Pupils: Pupils are equal, round, and reactive to light.   Neck:      Thyroid: No thyroid mass.      Trachea: Trachea normal.   Cardiovascular:      Rate and Rhythm: Normal rate and regular rhythm.      Heart sounds: Normal heart sounds. No murmur heard.     No gallop.   Pulmonary:      Effort: Pulmonary effort is normal.      Breath sounds: Normal breath sounds and air entry. No wheezing, rhonchi or rales.   Abdominal:      General: There is no distension.      Palpations: Abdomen is soft. There is no mass.      Tenderness: There is no abdominal tenderness. There is no right CVA tenderness, left CVA tenderness, guarding or rebound.   Musculoskeletal:         General: No tenderness or deformity. Normal range of motion.      Cervical back: Full passive range of motion without pain, normal range of motion and neck supple.      Thoracic back: Normal.      Right lower leg: No edema.      Left lower leg: No edema.   Skin:     General: Skin is warm and dry.      Coloration: Skin is not jaundiced.       Findings: No rash.   Neurological:      Mental Status: She is alert and oriented to person, place, and time.      Sensory: Sensation is intact.      Motor: Motor function is intact.      Coordination: Coordination is intact.      Gait: Gait is intact.      Deep Tendon Reflexes: Reflexes are normal and symmetric.   Psychiatric:         Mood and Affect: Mood and affect normal.         Judgment: Judgment normal.            PHQ-2 Depression Screening    Little interest or pleasure in doing things? Not at all   Feeling down, depressed, or hopeless? Not at all   PHQ-2 Total Score 0      PHQ-9 Depression Screening  Little interest or pleasure in doing things? Not at all   Feeling down, depressed, or hopeless? Not at all   PHQ-2 Total Score 0   Trouble falling or staying asleep, or sleeping too much?     Feeling tired or having little energy?     Poor appetite or overeating?     Feeling bad about yourself - or that you are a failure or have let yourself or your family down?     Trouble concentrating on things, such as reading the newspaper or watching television?     Moving or speaking so slowly that other people could have noticed? Or the opposite - being so fidgety or restless that you have been moving around a lot more than usual?     Thoughts that you would be better off dead, or of hurting yourself in some way?     PHQ-9 Total Score     If you checked off any problems, how difficult have these problems made it for you to do your work, take care of things at home, or get along with other people? Not difficult at all           Result Review  Data Reviewed:                   Assessment and Plan      Diagnoses and all orders for this visit:    1. Wellness examination (Primary)  -     CBC Auto Differential; Future  -     Comprehensive Metabolic Panel; Future  -     Lipid Panel; Future  -     TSH; Future  -     Urinalysis With Culture If Indicated - Urine, Clean Catch; Future  -     Hemoglobin A1c; Future    2. Mixed anxiety  and depressive disorder  -     busPIRone (BUSPAR) 10 MG tablet; Take 1 tablet by mouth 3 (Three) Times a Day As Needed (anxiety).  Dispense: 90 tablet; Refill: 2  -     citalopram (CeleXA) 20 MG tablet; Take 1 tablet by mouth Daily.  Dispense: 90 tablet; Refill: 1    3. Vertigo    4. Other migraine without status migrainosus, not intractable  -     rizatriptan (Maxalt) 10 MG tablet; Take 1 tablet by mouth 1 (One) Time As Needed for Migraine. May repeat in 2 hours if needed  Dispense: 9 tablet; Refill: 2  -     topiramate (Topamax) 50 MG tablet; Take 1 tablet by mouth 2 (Two) Times a Day.  Dispense: 180 tablet; Refill: 1    5. Overweight with body mass index (BMI) of 27 to 27.9 in adult    6. Meniere's disease of right ear  -     meclizine (ANTIVERT) 12.5 MG tablet; Take 1 tablet by mouth 3 (Three) Times a Day As Needed for Dizziness.  Dispense: 60 tablet; Refill: 0  -     fexofenadine-pseudoephedrine (Allegra-D Allergy & Congestion) 180-240 MG per 24 hr tablet; Take 1 tablet by mouth Daily.  Dispense: 30 tablet; Refill: 1    7. Allergy, subsequent encounter  -     montelukast (Singulair) 10 MG tablet; Take 1 tablet by mouth Every Night.  Dispense: 90 tablet; Refill: 1  -     fexofenadine-pseudoephedrine (Allegra-D Allergy & Congestion) 180-240 MG per 24 hr tablet; Take 1 tablet by mouth Daily.  Dispense: 30 tablet; Refill: 1    8. Screening for colon cancer  -     Ambulatory Referral to Gastroenterology    9. Screening for cervical cancer        Assessment & Plan  1. Medication Management.  She is currently taking Singulair, Allegra-D, Celexa, and BuSpar. She reports that her headaches are well-controlled with her current regimen. She also mentioned using meclizine 25 mg for vertigo and dizziness, which she finds effective. A prescription for Allegra-D will be provided. She will continue her current dosage of Celexa and BuSpar, mood is stable. A refill for meclizine 25 mg will be issued.    2. Health  Maintenance.  Comprehensive blood work, including cholesterol and thyroid function tests, will be conducted today. A consultation with gastroenterology for colon cancer screening will be arranged. She is advised to undergo a Pap smear this year and she can call and schedule that with a preferred provider she has at [facility]. Healthy diet and lifestyle counseling done.     Follow-up  The patient will follow up in 6 months.        Follow Up   Return in about 6 months (around 8/28/2025).  Patient was given instructions and counseling regarding her condition or for health maintenance advice. Please see specific information pulled into the AVS if appropriate.     Patient or patient representative verbalized consent for the use of Ambient Listening during the visit with  UMER Chowdhury for chart documentation. 2/28/2025  12:04 CST

## 2025-03-01 LAB
BACTERIA SPEC AEROBE CULT: NO GROWTH
TSH SERPL DL<=0.05 MIU/L-ACNC: 0.52 UIU/ML (ref 0.27–4.2)

## 2025-03-04 ENCOUNTER — TELEPHONE (OUTPATIENT)
Dept: FAMILY MEDICINE CLINIC | Facility: CLINIC | Age: 50
End: 2025-03-04
Payer: COMMERCIAL

## 2025-03-04 NOTE — PROGRESS NOTES
Please let pt know results: A1c non diabetic, lipid panel looks good, cmp ok, tsh wnl, ua ok, cbc ok, repeat annually.

## 2025-03-04 NOTE — TELEPHONE ENCOUNTER
----- Message from Elizabet Brice sent at 3/4/2025 11:14 AM CST -----  Please let pt know results: A1c non diabetic, lipid panel looks good, cmp ok, tsh wnl, ua ok, cbc ok, repeat annually.

## 2025-04-08 ENCOUNTER — OFFICE VISIT (OUTPATIENT)
Dept: GASTROENTEROLOGY | Facility: CLINIC | Age: 50
End: 2025-04-08
Payer: COMMERCIAL

## 2025-04-08 VITALS
HEIGHT: 65 IN | DIASTOLIC BLOOD PRESSURE: 80 MMHG | WEIGHT: 163 LBS | OXYGEN SATURATION: 96 % | BODY MASS INDEX: 27.16 KG/M2 | SYSTOLIC BLOOD PRESSURE: 130 MMHG | HEART RATE: 105 BPM | TEMPERATURE: 97.3 F

## 2025-04-08 DIAGNOSIS — Z12.11 ENCOUNTER FOR SCREENING FOR MALIGNANT NEOPLASM OF COLON: Primary | ICD-10-CM

## 2025-04-08 RX ORDER — SODIUM, POTASSIUM,MAG SULFATES 17.5-3.13G
SOLUTION, RECONSTITUTED, ORAL ORAL
Qty: 177 ML | Refills: 0 | Status: SHIPPED | OUTPATIENT
Start: 2025-04-08

## 2025-04-08 NOTE — PROGRESS NOTES
Aysha Barros  1975      4/8/2025  Chief Complaint   Patient presents with    Colonoscopy     Colon screening     Subjective   HPI  Aysha Barros is a 49 y.o. female who presents as a referral for preventative maintenance. She has no complaints of nausea or vomiting. No change in bowels. No wt loss. No BRBPR. No melena. There is NO family hx for colon cancer, that she is aware she is adopted. No abdominal pain.  Past Medical History:   Diagnosis Date    Anxiety     Depression     Meniere disease, right     Migraines     Seasonal allergies      Past Surgical History:   Procedure Laterality Date    ARM LESION/CYST EXCISION Right 12/27/2023    Procedure: RIGHT MIDDLE FINGER MARGINAL MASS EXCISION;  Surgeon: Jonathan Kevin MD;  Location: [facility] OR;  Service: Orthopedics;  Laterality: Right;     Outpatient Medications Marked as Taking for the 4/8/25 encounter (Office Visit) with Mirella Bass APRN   Medication Sig Dispense Refill    busPIRone (BUSPAR) 10 MG tablet Take 1 tablet by mouth 3 (Three) Times a Day As Needed (anxiety). 90 tablet 2    citalopram (CeleXA) 20 MG tablet Take 1 tablet by mouth Daily. 90 tablet 1    fexofenadine-pseudoephedrine (Allegra-D Allergy & Congestion) 180-240 MG per 24 hr tablet Take 1 tablet by mouth Daily. 30 tablet 1    fluticasone (Flonase) 50 MCG/ACT nasal spray 2 sprays into the nostril(s) as directed by provider Daily. 15.8 mL 2    glycopyrrolate (ROBINUL) 1 MG tablet Take 1 tablet by mouth Daily As Needed.      meclizine (ANTIVERT) 12.5 MG tablet Take 1 tablet by mouth 3 (Three) Times a Day As Needed for Dizziness. 60 tablet 0    montelukast (Singulair) 10 MG tablet Take 1 tablet by mouth Every Night. 90 tablet 1    ondansetron ODT (ZOFRAN-ODT) 8 MG disintegrating tablet Place 1 tablet on the tongue Every 8 (Eight) Hours As Needed for Nausea or Vomiting. 9 tablet 2    rizatriptan (Maxalt) 10 MG tablet Take 1 tablet by mouth 1 (One) Time As Needed for Migraine. May repeat in  "2 hours if needed 9 tablet 2    topiramate (Topamax) 50 MG tablet Take 1 tablet by mouth 2 (Two) Times a Day. 180 tablet 1     Allergies   Allergen Reactions    Cefaclor Hives     Social History     Socioeconomic History    Marital status:    Tobacco Use    Smoking status: Never     Passive exposure: Never    Smokeless tobacco: Never   Vaping Use    Vaping status: Never Used   Substance and Sexual Activity    Alcohol use: Not Currently    Drug use: Never    Sexual activity: Never     Family History   Adopted: Yes   Problem Relation Age of Onset    Colon cancer Neg Hx     Colon polyps Neg Hx      Health Maintenance   Topic Date Due    TDAP/TD VACCINES (1 - Tdap) Never done    PAP SMEAR  10/11/2015    HEPATITIS C SCREENING  Never done    COLORECTAL CANCER SCREENING  Never done    COVID-19 Vaccine (3 - 2024-25 season) 09/01/2024    INFLUENZA VACCINE  07/01/2025    ANNUAL PHYSICAL  02/28/2026    MAMMOGRAM  10/04/2026    Pneumococcal Vaccine 0-49  Aged Out       REVIEW OF SYSTEMS  General: well appearing, no fever chills or sweats, no unexplained wt loss  HEENT: no acute visual or hearing disturbances  Cardiovascular: No chest pain or palpitations  Pulmonary: No shortness of breath, coughing, wheezing or hemoptysis  : No burning, urgency, hematuria, or dysuria  Musculoskeletal: No joint pain or stiffness  Peripheral: no edema  Skin: No lesions or rashes  Neuro: No dizziness, headaches, stroke, syncope  Endocrine: No hot or cold intolerances  Hematological: No blood dyscrasias    Objective   Vitals:    04/08/25 0953   BP: 130/80   BP Location: Left arm   Pulse: 105   Temp: 97.3 °F (36.3 °C)   TempSrc: Temporal   SpO2: 96%   Weight: 73.9 kg (163 lb)   Height: 165.1 cm (65\")     Body mass index is 27.12 kg/m².         PHYSICAL EXAM  General: age appropriate well nourished well appearing, no acute distress  Head: normocephalic and atraumatic  Global assessment-supple  Neck-No JVD noted, no " lymphadenopathy  Pulmonary-clear to auscultation bilaterally, normal respiratory effort  Cardiovascular-normal rate and rhythm, normal heart sounds, S1 and S2 noted  Abdomen-soft, non tender, non distended, normal bowel sounds all 4 quadrants, no hepatosplenomegaly noted  Extremities-No clubbing cyanosis or edema  Neuro-Non focal, converses appropriately, awake, alert, oriented    Assessment & Plan     Diagnoses and all orders for this visit:    1. Encounter for screening for malignant neoplasm of colon (Primary)  -     Case Request; Standing  -     Case Request  -     sodium-potassium-magnesium sulfates (Suprep Bowel Prep Kit) 17.5-3.13-1.6 GM/177ML solution oral solution; Take as directed by office instructions provided  Dispense: 177 mL; Refill: 0    Other orders  -     Implement Anesthesia Orders Day of Procedure; Standing  -     Follow Anesthesia Guidelines / Protocol; Future  -     Verify bowel prep was successful; Standing        COLONOSCOPY WITH ANESTHESIA (N/A)  Body mass index is 27.12 kg/m².    Patient instructions on prep prior to procedure provided to the patient.    All risks, benefits, alternatives, and indications of colonoscopy procedure have been discussed with the patient. Risks to include perforation of the colon requiring possible surgery or colostomy, risk of bleeding from biopsies or removal of colon tissue, possibility of missing a colon polyp or cancer, or adverse drug reaction.  Benefits to include the diagnosis and management of disease of the colon and rectum. Alternatives to include barium enema, radiographic evaluation, lab testing or no intervention. Pt verbalizes understanding and agrees.     Mirella Bass, APRN  2025  10:13 CDT      IF YOU SMOKE OR USE TOBACCO PLEASE READ THE FOLLOWIN minutes reading provided    Why is smoking bad for me?  Smoking increases the risk of heart disease, lung disease, vascular disease, stroke, and cancer.     If you smoke, STOP!    If you  would like more information on quitting smoking, please visit the [employer] website: [email or web address]   This link will provide additional resources including the QUIT line and the Beat the Pack support groups.     For more information:    Quit Now [place]  1-800-QUIT-NOW  https://[place].quitlogix.org/en-US/

## 2025-05-13 ENCOUNTER — E-VISIT (OUTPATIENT)
Dept: FAMILY MEDICINE CLINIC | Facility: TELEHEALTH | Age: 50
End: 2025-05-13

## 2025-05-13 NOTE — E-VISIT TREATED
Date: 2025 12:42:34  Clinician: Jazmyn Miranda  Clinician NPI: [id number]  Patient: Aysha Barros  Patient : 1975  Patient Address: [address] [place] [address]  Patient Phone: [phone]  Visit Protocol: URI  Patient Summary:  Aysha is a 49 year old ( : 1975 ) female who initiated a visit for cold, sinus infection, or influenza.     Aysha states the symptoms started suddenly 3-5 days ago.   Symptom start date: 2025   The symptoms consist of a   headache, a cough, facial pain or pressure, nasal congestion, malaise, and rhinitis.   Symptom details     Nasal secretions: The color of the mucus is green.    Cough: Aysha coughs every 5-10 minutes and the cough is more bothersome at night. Phlegm comes into the throat when coughing. Aysha believes the cough is caused by post-nasal drip. The color of the phlegm is green and yellow.     Facial pain or pressure: The facial pain or pressure feels worse when bending over or leaning forward.     Headache: The headache is mild (1-3 on a 10 point pain scale).      Aysha denies having teeth pain, myalgias, fever, sore throat, anosmia and ageusia, wheezing, chills, diarrhea, nausea, ear pain, and vomiting. Aysha also denies having recent facial or sinus surgery in the past 60 days, taking antibiotic medication in   the past month, and double sickening (worsening symptoms after initial improvement). Aysha is not experiencing dyspnea.   Precipitating events  Aysha has not recently been exposed to someone with influenza. Aysha has not been in close contact with any   high risk individuals.    Aysha has received the influenza vaccine more than 2 weeks ago.   Pertinent COVID-19 (Coronavirus) information  Since the symptoms started, Aysha has tested for COVID-19. The result of the test was negative. The negative result   was within the last 48 hours.   Aysha has received a COVID-19 vaccine in the past year.     Pertinent medical history     Aysha had  1 sinus infection within the past year.   A provider has not told Aysha to avoid NSAIDs.   Aysha does not get yeast   infections when an antibiotic is taken.   Aysha does not have diabetes. Aysha denies having immunosuppressive conditions (e.g., chemotherapy, HIV, organ transplant, long-term use of steroids or other immunosuppressive medications, splenectomy). Aysha   denies having congestive heart failure, severe COPD, and heart disease. Aysha does not have asthma.   Aysha denies having chronic lung disease, cystic fibrosis, hypertension, long-term disabilities, mental health conditions, sickle cell disease or   thalassemia, stroke or other cardiovascular disease, substance use disorders, or tuberculosis (TB).  Aysha does not smoke or use smokeless tobacco. Aysha does not vape or use other e-cigarette products.   Aysha denies pregnancy and denies breastfeeding.     Weight: 160 lbs (72.57 kg)  Reason for repeat visit for the same protocol within 24 hours:  It timed out before payment. I had to start a new one.  See the History of referred by protocol and completed visits section for details on previous visits   (visits currently in queue to be diagnosed will not appear in this section).    MEDICATIONS: fexofenadine-pseudoephedrine oral, buspirone oral, rizatriptan oral, meclizine oral, montelukast oral, topiramate oral, fluticasone propionate nasal, ALLERGIES: cefaclor  Clinician Response:  Dear Aysha,  Based on the information provided, you have acute bacterial sinusitis, also known as a sinus infection. Most cases of sinus infections are caused by viruses and symptoms start to improve on their own in 7-10 days. Both   viral and bacterial sinus infections usually resolve on its own. A bacterial infection may have developed if any of the following apply to you.      Symptoms of sinus infection lasting 10 days or more without showing any improvement    If you feel better after a viral upper respiratory infection such  as, a cold but then suddenly feel worse with symptoms such as fever, headache, or increase in nasal discharge     Self care  Steps you can take to be as comfortable as possible:     Rest.    Drink plenty of fluids.    Take a warm shower to loosen congestion.    Use a cool-mist humidifier.    Take a spoonful of honey to reduce your cough.     When to seek care  Please be seen in a clinic or urgent care if any of the following occur:     New symptoms develop, or symptoms become worse    Symptoms do not start to improve after 3 days of treatment     Call 911 or go to the emergency room if any of the following occur:     If you feel that your throat is closing off    Suddenly develop a rash    Unable to swallow fluids or are drooling     For the latest updates on COVID-19 (Coronavirus), please visit the Centers for Disease Control and Prevention (CDC). Also, your state and local health department websites may provide additional guidance regarding testing and isolation recommendations   for your location.   Diagnosis: Acute bacterial sinusitis  Diagnosis ICD: J01.90    Follow up instructions:  ATTENTION: If you have been prescribed medications, your prescriptions will not be sent until you choose your pharmacy. To do so open the link within your notification, or go to [employer] and click eVisit in the menu to open your   treatment plan. From there, you can select your pharmacy at the bottom of your after visit summary. You can also go to [email or web address]   Additional Clinician Notes:  Please make sure to include all information of your symptoms onset/presentation in your next Evisit.    Prescriptions  Prescription: azithromycin (Zithromax) 250 mg oral tablet, take 2 tablets by mouth 1 time on day 1, then 1 tablet daily x 4 days  Sent To: [facility]/pharmacy #[id number] - [id number] - [address] [place] [address]  Prescription: prednisone 10 mg oral tablets,dose pack, take tablets,dose  pack  Sent To: [facility]/pharmacy #[id number] - [id number] - [address] [place] [address]  Prescription: fluticasone 50 mcg/actuation nasal spray,suspension, spray 2 times in each nostril 1 time per day; after 1 week, may adjust to 1 - 2 sprays in each nostril 1 time per day.  Sent To: [facility]/pharmacy #[id number] - [id number] - [address] [place] [address]  Prescription: promethazine-DM 6.25-15 mg/5 mL oral syrup, take 5 milliliters by mouth every 4 to 6 hours as needed for cough  Sent To: [facility]/pharmacy #[id number] - [id number] - [address] [place] [address]  ADDENDA:   May 13 2025, 01:12PM (Jazmyn Miranda NP): (System Generated)  Added J01.90 Acute bacterial sinusitis  Removed J01.90 Viral sinusitis  May 13 2025, 01:12PM (Jazmyn Miranda NP): (System Generated)   RX ADDED: azithromycin (Zithromax) 250 mg oral tablet: Take 2 tablets by mouth 1 time on day 1, then 1 tablet daily x 4 days

## 2025-06-11 ENCOUNTER — TELEPHONE (OUTPATIENT)
Dept: GASTROENTEROLOGY | Facility: CLINIC | Age: 50
End: 2025-06-11
Payer: COMMERCIAL

## 2025-06-11 NOTE — TELEPHONE ENCOUNTER
Pt left vm saying she was out of town until tomorrow and needed to r/s. Called pt back to r/s, but no answer. Will update schedule. Will place in recall to follow up.

## 2025-07-30 DIAGNOSIS — G43.809 OTHER MIGRAINE WITHOUT STATUS MIGRAINOSUS, NOT INTRACTABLE: ICD-10-CM

## 2025-07-30 RX ORDER — RIZATRIPTAN BENZOATE 10 MG/1
10 TABLET ORAL ONCE AS NEEDED
Qty: 9 TABLET | Refills: 2 | Status: SHIPPED | OUTPATIENT
Start: 2025-07-30

## 2025-07-30 NOTE — TELEPHONE ENCOUNTER
Rx Refill Note  Requested Prescriptions     Pending Prescriptions Disp Refills    rizatriptan (MAXALT) 10 MG tablet [Pharmacy Med Name: RIZATRIPTAN 10 MG TABLET] 9 tablet 2     Sig: TAKE 1 TABLET BY MOUTH 1 (ONE) TIME AS NEEDED FOR MIGRAINE. MAY REPEAT IN 2 HOURS IF NEEDED      Last office visit with prescribing clinician: 2/28/2025   Last telemedicine visit with prescribing clinician: Visit date not found   Next office visit with prescribing clinician: 8/29/2025                         Would you like a call back once the refill request has been completed: [] Yes [] No    If the office needs to give you a call back, can they leave a voicemail: [] Yes [] No    Rosa Kaiser MA  07/30/25, 15:50 CDT

## (undated) DEVICE — DISPOSABLE TOURNIQUET CUFF 24"X4", 1-LINE, YELLOW, STERILE, 1EA/PK, 10PK/CS: Brand: ASP MEDICAL

## (undated) DEVICE — CVR BRD ARM 13X30

## (undated) DEVICE — SYR LL TP 10ML STRL

## (undated) DEVICE — BNDG ELAS SLF ADHR 2IN 5YD LF

## (undated) DEVICE — CABL BIPOL MEGADYNE 12FT DISP

## (undated) DEVICE — GLV SURG DERMASSURE GRN LF PF 8.0

## (undated) DEVICE — SUT ETHLN 4/0 FS2 18IN 662H

## (undated) DEVICE — PK EXTRM 30

## (undated) DEVICE — PREP SOL POVIDONE/IODINE BT 4OZ

## (undated) DEVICE — TRAP FLD MINIVAC MEGADYNE 100ML

## (undated) DEVICE — SOL HYDROGEN PEROX 3PCT 16OZ

## (undated) DEVICE — GAUZE,SPONGE,4"X4",12PLY,STERILE,LF,2'S: Brand: MEDLINE

## (undated) DEVICE — BANDAGE,GAUZE,CONFORMING,1"X75",STRL,LF: Brand: MEDLINE

## (undated) DEVICE — 4-PORT MANIFOLD: Brand: NEPTUNE 2

## (undated) DEVICE — DRESSING,GAUZE,XEROFORM,CURAD,5"X9",ST: Brand: CURAD

## (undated) DEVICE — CYSTO/BLADDER IRRIGATION SET, REGULATING CLAMP